# Patient Record
Sex: FEMALE | Race: WHITE | Employment: OTHER | URBAN - METROPOLITAN AREA
[De-identification: names, ages, dates, MRNs, and addresses within clinical notes are randomized per-mention and may not be internally consistent; named-entity substitution may affect disease eponyms.]

---

## 2021-04-01 LAB
ALBUMIN SERPL-MCNC: NORMAL G/DL
ALP BLD-CCNC: NORMAL U/L
ALT SERPL-CCNC: NORMAL U/L
AST SERPL-CCNC: NORMAL U/L
BILIRUB SERPL-MCNC: NORMAL MG/DL
BUN BLDV-MCNC: NORMAL MG/DL
CALCIUM SERPL-MCNC: NORMAL MG/DL
CHLORIDE BLD-SCNC: NORMAL MMOL/L
CHOLESTEROL, TOTAL: NORMAL
CHOLESTEROL/HDL RATIO: NORMAL
CO2: NORMAL
CREAT SERPL-MCNC: NORMAL MG/DL
GLUCOSE FASTING: NORMAL
HDLC SERPL-MCNC: NORMAL MG/DL
LDL CHOLESTEROL CALCULATED: NORMAL
NONHDLC SERPL-MCNC: NORMAL MG/DL
POTASSIUM SERPL-SCNC: NORMAL MMOL/L
SODIUM BLD-SCNC: NORMAL MMOL/L
TOTAL PROTEIN: NORMAL
TRIGL SERPL-MCNC: NORMAL MG/DL
VLDLC SERPL CALC-MCNC: NORMAL MG/DL

## 2021-10-26 ENCOUNTER — OFFICE VISIT (OUTPATIENT)
Dept: PRIMARY CARE CLINIC | Age: 86
End: 2021-10-26
Payer: MEDICARE

## 2021-10-26 VITALS
SYSTOLIC BLOOD PRESSURE: 124 MMHG | HEIGHT: 68 IN | OXYGEN SATURATION: 96 % | DIASTOLIC BLOOD PRESSURE: 78 MMHG | WEIGHT: 190.2 LBS | BODY MASS INDEX: 28.82 KG/M2 | HEART RATE: 68 BPM | TEMPERATURE: 96.9 F

## 2021-10-26 DIAGNOSIS — J30.2 SEASONAL ALLERGIC RHINITIS, UNSPECIFIED TRIGGER: ICD-10-CM

## 2021-10-26 DIAGNOSIS — J45.909 ASTHMA DUE TO ENVIRONMENTAL ALLERGIES: ICD-10-CM

## 2021-10-26 DIAGNOSIS — Z76.89 ENCOUNTER TO ESTABLISH CARE WITH NEW DOCTOR: Primary | ICD-10-CM

## 2021-10-26 DIAGNOSIS — F41.1 GENERALIZED ANXIETY DISORDER: ICD-10-CM

## 2021-10-26 DIAGNOSIS — Z12.31 BREAST CANCER SCREENING BY MAMMOGRAM: ICD-10-CM

## 2021-10-26 DIAGNOSIS — F43.21 GRIEF: ICD-10-CM

## 2021-10-26 PROBLEM — R91.8 PULMONARY NODULES: Status: ACTIVE | Noted: 2021-10-26

## 2021-10-26 PROBLEM — I48.0 PAROXYSMAL ATRIAL FIBRILLATION (HCC): Status: ACTIVE | Noted: 2021-10-26

## 2021-10-26 PROBLEM — K63.5 SESSILE COLONIC POLYP: Status: ACTIVE | Noted: 2021-10-26

## 2021-10-26 PROCEDURE — 99204 OFFICE O/P NEW MOD 45 MIN: CPT | Performed by: STUDENT IN AN ORGANIZED HEALTH CARE EDUCATION/TRAINING PROGRAM

## 2021-10-26 RX ORDER — CETIRIZINE HYDROCHLORIDE 10 MG/1
1 TABLET ORAL DAILY
COMMUNITY
Start: 2021-09-20 | End: 2022-01-04 | Stop reason: SDUPTHER

## 2021-10-26 RX ORDER — ALBUTEROL SULFATE 90 UG/1
2 AEROSOL, METERED RESPIRATORY (INHALATION) EVERY 4 HOURS PRN
Qty: 18 G | Refills: 0 | Status: SHIPPED | OUTPATIENT
Start: 2021-10-26

## 2021-10-26 RX ORDER — FLUTICASONE PROPIONATE 50 MCG
SPRAY, SUSPENSION (ML) NASAL
COMMUNITY
Start: 2021-09-20

## 2021-10-26 RX ORDER — BACILLUS COAGULANS/INULIN 1B-250 MG
1 CAPSULE ORAL DAILY
COMMUNITY

## 2021-10-26 RX ORDER — ALPRAZOLAM 0.25 MG/1
1 TABLET ORAL DAILY PRN
COMMUNITY
Start: 2021-09-20

## 2021-10-26 RX ORDER — SODIUM CHLORIDE/ALOE VERA
GEL (GRAM) NASAL
COMMUNITY
Start: 2021-07-29 | End: 2022-04-26

## 2021-10-26 RX ORDER — CITALOPRAM 20 MG/1
1 TABLET ORAL DAILY
COMMUNITY
Start: 2021-09-20 | End: 2022-01-04 | Stop reason: SDUPTHER

## 2021-10-26 RX ORDER — PANTOPRAZOLE SODIUM 40 MG/1
1 TABLET, DELAYED RELEASE ORAL DAILY
COMMUNITY
Start: 2021-09-20 | End: 2022-01-04 | Stop reason: SDUPTHER

## 2021-10-26 RX ORDER — RIVAROXABAN 15 MG/1
1 TABLET, FILM COATED ORAL DAILY
COMMUNITY
Start: 2021-10-22 | End: 2021-11-16 | Stop reason: SDUPTHER

## 2021-10-26 SDOH — ECONOMIC STABILITY: FOOD INSECURITY: WITHIN THE PAST 12 MONTHS, THE FOOD YOU BOUGHT JUST DIDN'T LAST AND YOU DIDN'T HAVE MONEY TO GET MORE.: NEVER TRUE

## 2021-10-26 SDOH — ECONOMIC STABILITY: FOOD INSECURITY: WITHIN THE PAST 12 MONTHS, YOU WORRIED THAT YOUR FOOD WOULD RUN OUT BEFORE YOU GOT MONEY TO BUY MORE.: NEVER TRUE

## 2021-10-26 ASSESSMENT — PATIENT HEALTH QUESTIONNAIRE - PHQ9
2. FEELING DOWN, DEPRESSED OR HOPELESS: 0
SUM OF ALL RESPONSES TO PHQ9 QUESTIONS 1 & 2: 0
SUM OF ALL RESPONSES TO PHQ QUESTIONS 1-9: 0
1. LITTLE INTEREST OR PLEASURE IN DOING THINGS: 0
SUM OF ALL RESPONSES TO PHQ QUESTIONS 1-9: 0
SUM OF ALL RESPONSES TO PHQ QUESTIONS 1-9: 0

## 2021-10-26 ASSESSMENT — SOCIAL DETERMINANTS OF HEALTH (SDOH): HOW HARD IS IT FOR YOU TO PAY FOR THE VERY BASICS LIKE FOOD, HOUSING, MEDICAL CARE, AND HEATING?: NOT HARD AT ALL

## 2021-10-26 NOTE — PROGRESS NOTES
weeks (around 11/23/2021) for medicare wellness visit. This provider and patient were wearing surgical masks and practiced social distancing when appropriate during visit due to COVID-19 pandemic. Subjective:     Chief Complaint   Patient presents with    Established New Doctor       Patient needs new PCP. Reviewed history as below. Patient notes her partner is developing dementia. This has caused her a lot of stress. Talks with neighbor for support. Review of Systems   Respiratory: Negative for cough and shortness of breath. Cardiovascular: Negative for chest pain and palpitations. Allergic/Immunologic: Positive for environmental allergies. Psychiatric/Behavioral: Negative for dysphoric mood and suicidal ideas. The patient is not nervous/anxious. Medical History:   History reviewed and updated as needed.     Patient Active Problem List   Diagnosis    Paroxysmal atrial fibrillation (HCC)    Seasonal allergic rhinitis    Asthma due to environmental allergies    Pulmonary nodules    Generalized anxiety disorder    Sessile colonic polyp        Past Medical History:   Diagnosis Date    Anxiety     Asthma     Atrial fibrillation [facility])        Past Surgical History:   Procedure Laterality Date    CATARACT REMOVAL Bilateral     CHOLECYSTECTOMY  2000    ELBOW SURGERY Right 2011    FOOT SURGERY Right 2010    ROTATOR CUFF REPAIR Right 2014       Family History   Problem Relation Age of Onset    Stroke Mother     No Known Problems Brother     Stroke Maternal Grandmother     Other Maternal Grandfather         brain aneurysm    Stroke Paternal Grandmother     No Known Problems Son     No Known Problems Son     No Known Problems Son     Lung Cancer Brother     Emphysema Sister        Medications:     Current Outpatient Medications:     cetirizine (ZYRTEC) 10 MG tablet, Take 1 tablet by mouth daily, Disp: , Rfl:     ALPRAZolam (XANAX) 0.25 MG tablet, Take 1 tablet by mouth daily as needed. , Disp: , Rfl:     XARELTO 15 MG TABS tablet, Take 1 tablet by mouth daily, Disp: , Rfl:     citalopram (CELEXA) 20 MG tablet, Take 1 tablet by mouth daily, Disp: , Rfl:     pantoprazole (PROTONIX) 40 MG tablet, Take 1 tablet by mouth daily, Disp: , Rfl:     fluticasone (FLONASE) 50 MCG/ACT nasal spray, , Disp: , Rfl:     saline nasal gel (AYR) GEL, , Disp: , Rfl:     Budeson-Glycopyrrol-Formoterol 160-9-4.8 MCG/ACT AERO, Inhale 1 puff into the lungs 4 times daily as needed, Disp: , Rfl:     Bacillus Coagulans-Inulin (PROBIOTIC) 1-250 BILLION-MG CAPS, Take 1 capsule by mouth daily, Disp: , Rfl:     Allergies:   No Known Allergies    Social History:     Social History     Socioeconomic History    Marital status: Single     Spouse name: Not on file    Number of children: Not on file    Years of education: Not on file    Highest education level: Not on file   Occupational History    Not on file   Tobacco Use    Smoking status: Never Smoker    Smokeless tobacco: Never Used   Substance and Sexual Activity    Alcohol use: Not on file    Drug use: Not on file    Sexual activity: Not on file   Other Topics Concern    Not on file   Social History Narrative    Not on file     Social Determinants of Health     Financial Resource Strain: Low Risk     Difficulty of Paying Living Expenses: Not hard at all   Food Insecurity: No Food Insecurity    Worried About Running Out of Food in the Last Year: Never true    Raeann of Food in the Last Year: Never true   Transportation Needs:     Lack of Transportation (Medical):      Lack of Transportation (Non-Medical):    Physical Activity:     Days of Exercise per Week:     Minutes of Exercise per Session:    Stress:     Feeling of Stress :    Social Connections:     Frequency of Communication with Friends and Family:     Frequency of Social Gatherings with Friends and Family:     Attends Orthodoxy Services:     Active Member of Clubs or Answer:   screening     Order Specific Question:   Where will the exam be performed? Answer:   [facility]    JOSE SAMARIA DIGITAL SCREEN BILATERAL        No results found for this or any previous visit (from the past 24 hour(s)).

## 2021-11-03 ENCOUNTER — TELEPHONE (OUTPATIENT)
Dept: PRIMARY CARE CLINIC | Age: 86
End: 2021-11-03

## 2021-11-03 NOTE — TELEPHONE ENCOUNTER
----- Message from Moises Oconnor MA sent at 11/3/2021 10:44 AM EDT -----  Subject: Message to Provider    QUESTIONS  Information for Provider? Lost Mammo order. can she stop in an d  a   new one? Please call when ready. ---------------------------------------------------------------------------  --------------  Julisajake Ibarra INFO  What is the best way for the office to contact you? OK to leave message on   voicemail  Preferred Call Back Phone Number? [phone]  ---------------------------------------------------------------------------  --------------  SCRIPT ANSWERS  Relationship to Patient?  Self

## 2021-11-18 RX ORDER — RIVAROXABAN 15 MG/1
15 TABLET, FILM COATED ORAL DAILY
Qty: 30 TABLET | Refills: 5 | Status: SHIPPED
Start: 2021-11-18 | End: 2022-01-04 | Stop reason: SDUPTHER

## 2021-11-23 ENCOUNTER — OFFICE VISIT (OUTPATIENT)
Dept: PRIMARY CARE CLINIC | Age: 86
End: 2021-11-23
Payer: MEDICARE

## 2021-11-23 VITALS
SYSTOLIC BLOOD PRESSURE: 138 MMHG | TEMPERATURE: 97.3 F | OXYGEN SATURATION: 95 % | WEIGHT: 191.6 LBS | DIASTOLIC BLOOD PRESSURE: 80 MMHG | HEART RATE: 69 BPM | BODY MASS INDEX: 29.04 KG/M2 | HEIGHT: 68 IN

## 2021-11-23 DIAGNOSIS — E66.3 OVERWEIGHT (BMI 25.0-29.9): ICD-10-CM

## 2021-11-23 DIAGNOSIS — E04.2 MULTINODULAR THYROID: ICD-10-CM

## 2021-11-23 DIAGNOSIS — Z71.89 ADVANCED CARE PLANNING/COUNSELING DISCUSSION: ICD-10-CM

## 2021-11-23 DIAGNOSIS — E78.2 MIXED HYPERLIPIDEMIA: ICD-10-CM

## 2021-11-23 DIAGNOSIS — R91.8 PULMONARY NODULES: ICD-10-CM

## 2021-11-23 DIAGNOSIS — Z13.1 SCREENING FOR DIABETES MELLITUS (DM): ICD-10-CM

## 2021-11-23 DIAGNOSIS — M85.89 OSTEOPENIA OF MULTIPLE SITES: ICD-10-CM

## 2021-11-23 DIAGNOSIS — E04.2 MULTINODULAR THYROID: Primary | ICD-10-CM

## 2021-11-23 DIAGNOSIS — Z00.00 ROUTINE GENERAL MEDICAL EXAMINATION AT A HEALTH CARE FACILITY: Primary | ICD-10-CM

## 2021-11-23 PROBLEM — I35.1 MODERATE AORTIC VALVE REGURGITATION: Status: ACTIVE | Noted: 2021-11-23

## 2021-11-23 PROBLEM — E04.1 THYROID NODULE: Status: ACTIVE | Noted: 2021-11-23

## 2021-11-23 PROBLEM — J43.8 OTHER EMPHYSEMA (HCC): Status: ACTIVE | Noted: 2021-10-26

## 2021-11-23 PROBLEM — I35.8 MILD AORTIC VALVE SCLEROSIS: Status: ACTIVE | Noted: 2021-11-23

## 2021-11-23 PROBLEM — K64.8 OTHER HEMORRHOIDS: Status: ACTIVE | Noted: 2021-11-23

## 2021-11-23 LAB
ALBUMIN SERPL-MCNC: 4.3 G/DL (ref 3.5–5.2)
ALP BLD-CCNC: 71 U/L (ref 35–104)
ALT SERPL-CCNC: 69 U/L (ref 0–32)
ANION GAP SERPL CALCULATED.3IONS-SCNC: 11 MMOL/L (ref 7–16)
AST SERPL-CCNC: 58 U/L (ref 0–31)
BILIRUB SERPL-MCNC: 0.3 MG/DL (ref 0–1.2)
BUN BLDV-MCNC: 11 MG/DL (ref 6–23)
CALCIUM SERPL-MCNC: 9.1 MG/DL (ref 8.6–10.2)
CHLORIDE BLD-SCNC: 102 MMOL/L (ref 98–107)
CHOLESTEROL, TOTAL: 256 MG/DL (ref 0–199)
CO2: 24 MMOL/L (ref 22–29)
CREAT SERPL-MCNC: 1.1 MG/DL (ref 0.5–1)
GFR AFRICAN AMERICAN: 57
GFR NON-AFRICAN AMERICAN: 47 ML/MIN/1.73
GLUCOSE BLD-MCNC: 91 MG/DL (ref 74–99)
HDLC SERPL-MCNC: 36 MG/DL
LDL CHOLESTEROL CALCULATED: 174 MG/DL (ref 0–99)
POTASSIUM SERPL-SCNC: 4.5 MMOL/L (ref 3.5–5)
SODIUM BLD-SCNC: 137 MMOL/L (ref 132–146)
T4 FREE: 1.03 NG/DL (ref 0.93–1.7)
TOTAL PROTEIN: 6.6 G/DL (ref 6.4–8.3)
TRIGL SERPL-MCNC: 231 MG/DL (ref 0–149)
TSH SERPL DL<=0.05 MIU/L-ACNC: 1.89 UIU/ML (ref 0.27–4.2)
VLDLC SERPL CALC-MCNC: 46 MG/DL

## 2021-11-23 PROCEDURE — G0439 PPPS, SUBSEQ VISIT: HCPCS | Performed by: STUDENT IN AN ORGANIZED HEALTH CARE EDUCATION/TRAINING PROGRAM

## 2021-11-23 ASSESSMENT — PATIENT HEALTH QUESTIONNAIRE - PHQ9
2. FEELING DOWN, DEPRESSED OR HOPELESS: 0
SUM OF ALL RESPONSES TO PHQ QUESTIONS 1-9: 0
SUM OF ALL RESPONSES TO PHQ QUESTIONS 1-9: 0
1. LITTLE INTEREST OR PLEASURE IN DOING THINGS: 0
SUM OF ALL RESPONSES TO PHQ QUESTIONS 1-9: 0
SUM OF ALL RESPONSES TO PHQ9 QUESTIONS 1 & 2: 0

## 2021-11-23 ASSESSMENT — LIFESTYLE VARIABLES
AUDIT-C TOTAL SCORE: 3
HOW OFTEN DO YOU HAVE A DRINK CONTAINING ALCOHOL: 3
HOW MANY STANDARD DRINKS CONTAINING ALCOHOL DO YOU HAVE ON A TYPICAL DAY: 0
HOW OFTEN DO YOU HAVE SIX OR MORE DRINKS ON ONE OCCASION: 0

## 2021-11-23 ASSESSMENT — ENCOUNTER SYMPTOMS
COUGH: 0
SHORTNESS OF BREATH: 0

## 2021-11-23 NOTE — PROGRESS NOTES
Medicare Annual Wellness Visit  Name: Aishwarya Arciniega Date: 2021   MRN: <[id number]> Sex: Female   Age: 81 y.o. Ethnicity: Non- / Non    : 1935 Race: White (non-)      Anabella Gaona is here for Medicare AWV Anushka Quinteros)    Screenings for behavioral, psychosocial and functional/safety risks, and cognitive dysfunction are all negative except as indicated below. These results, as well as other patient data from the [address] form, are documented in Flowsheets linked to this Encounter. No Known Allergies    Prior to Visit Medications    Medication Sig Taking? Authorizing Provider   XARELTO 15 MG TABS tablet Take 1 tablet by mouth daily Yes Marium Wagner MD   cetirizine (ZYRTEC) 10 MG tablet Take 1 tablet by mouth daily Yes Historical Provider, MD   ALPRAZolam (XANAX) 0.25 MG tablet Take 1 tablet by mouth daily as needed.  Yes Historical Provider, MD   citalopram (CELEXA) 20 MG tablet Take 1 tablet by mouth daily Yes Historical Provider, MD   pantoprazole (PROTONIX) 40 MG tablet Take 1 tablet by mouth daily Yes Historical Provider, MD   fluticasone (FLONASE) 50 MCG/ACT nasal spray  Yes Historical Provider, MD   saline nasal gel (AYR) GEL  Yes Historical Provider, MD   Bacillus Coagulans-Inulin (PROBIOTIC) 1-250 BILLION-MG CAPS Take 1 capsule by mouth daily Yes Historical Provider, MD   albuterol sulfate HFA (VENTOLIN HFA) 108 (90 Base) MCG/ACT inhaler Inhale 2 puffs into the lungs every 4 hours as needed for Wheezing or Shortness of Breath (coughing) Yes Marium Wagner MD       Past Medical History:   Diagnosis Date    Anxiety     Asthma     Atrial fibrillation [facility])        Past Surgical History:   Procedure Laterality Date    CATARACT REMOVAL Bilateral     CHOLECYSTECTOMY  2000    ELBOW SURGERY Right 2011    FOOT SURGERY Right 2010    ROTATOR CUFF REPAIR Right 2014       Family History   Problem Relation Age of Onset    Stroke Mother     No Known Problems Brother     Stroke Maternal Grandmother     Other Maternal Grandfather         brain aneurysm    Stroke Paternal Grandmother     No Known Problems Son     No Known Problems Son     No Known Problems Son     Lung Cancer Brother     Emphysema Sister        CareTeam (Including outside providers/suppliers regularly involved in providing care):   Patient Care Team:  Merly Maurer MD as PCP - General (Family Medicine)  [facility]    Wt Readings from Last 3 Encounters:   11/23/21 191 lb 9.6 oz (86.9 kg)   10/26/21 190 lb 3.2 oz (86.3 kg)     Vitals:    11/23/21 1355   BP: 138/80   Pulse: 69   Temp: 97.3 °F (36.3 °C)   SpO2: 95%   Weight: 191 lb 9.6 oz (86.9 kg)   Height: 5' 8\" (1.727 m)     Body mass index is 29.13 kg/m². Based upon direct observation of the patient, evaluation of cognition reveals recent and remote memory intact. General Appearance: alert and oriented to person, place and time, well developed and well- nourished, in no acute distress  Skin: warm and dry, no rash or erythema  Head: normocephalic and atraumatic  Pulmonary/Chest: clear to auscultation bilaterally- no wheezes, rales or rhonchi, normal air movement, no respiratory distress  Cardiovascular: normal rate, regular rhythm, holosystolic murmur best heard at RUSB, no rubs, clicks, or gallops  Extremities: no cyanosis, clubbing or edema      Patient's complete Health Risk Assessment and screening values have been reviewed and are found in Flowsheets. The following problems were reviewed today and where indicated follow up appointments were made and/or referrals ordered. Positive Risk Factor Screenings with Interventions:          General Health and ACP:  General  In general, how would you say your health is?: Excellent  In the past 7 days, have you experienced any of the following?  New or Increased Pain, New or Increased Fatigue, Loneliness, Social Isolation, Stress or Anger?: None of These  Do you get the social and emotional support that you need?: Yes  Do you have a Living Will?: Yes  Advance Directives     Power of John Rowan Will ACP-Advance Directive ACP-Power of     Not on File Not on File Not on File Not on File      General Health Risk Interventions:  · None     Hearing/Vision:  No exam data present  Hearing/Vision  Do you or your family notice any trouble with your hearing that hasn't been managed with hearing aids?: (!) Yes  Do you have difficulty driving, watching TV, or doing any of your daily activities because of your eyesight?: No  Have you had an eye exam within the past year?: Yes  Hearing/Vision Interventions:  · Hearing concerns:  uses aids, has difficulty not being able to read lips with masks      Personalized Preventive Plan   Current Health Maintenance Status  Immunization History   Administered Date(s) Administered    COVID-19, Giovanna Ornelas, Primary or Immunocompromised, PF, 100mcg/0.5mL 01/20/2021, 02/17/2021, 11/03/2021    Influenza Virus Vaccine 09/27/2012, 10/01/2013    Influenza, High Dose (Fluzone 65 yrs and older) 09/22/2017, 09/10/2018    Influenza, High-dose, Quadv, 65 yrs +, IM (Fluzone) 09/20/2021    Pneumococcal Conjugate 13-valent (Lorri Pancake) 01/01/2004    Pneumococcal Polysaccharide ([id number]) 08/12/2013        Health Maintenance   Topic Date Due    DTaP/Tdap/Td vaccine (1 - Tdap) Never done    Shingles Vaccine (1 of 2) Never done    DEXA (modify frequency per FRAX score)  Never done   [employer] Visit (AWV)  Never done    Flu vaccine  Completed    Pneumococcal 65+ years Vaccine  Completed    COVID-19 Vaccine  Completed    Hepatitis A vaccine  Aged Out    Hepatitis B vaccine  Aged Out    Hib vaccine  Aged Out    Meningococcal (ACWY) vaccine  Aged Out     Recommendations for Destineer Due: see orders and patient instructions/AVS.  .   Recommended screening schedule for the next 5-10 years is provided to the patient in written form: see Patient Sary Cuevas was seen today for medicare awv. Diagnoses and all orders for this visit:    Routine general medical examination at a health care facility  Comments:   updated  TD booster and DEXA previously completed  Patient advised to obtain Shingrix at pharmacy if not already completed    Osteopenia of multiple sites  Comments:  Last DEXA 6/2021, showed osteopenia  Next due 2023    Mixed hyperlipidemia  -     LIPID PANEL; Future    Screening for diabetes mellitus (DM)  -     COMPREHENSIVE METABOLIC PANEL; Future    Overweight (BMI 25.0-29.9)  -     LIPID PANEL; Future  -     COMPREHENSIVE METABOLIC PANEL; Future    Pulmonary nodules  -     CT CHEST WO CONTRAST; Future    Advanced care planning/counseling discussion  Comments:  Code status updated to full code  Patient will bring in copies of living will/POA  Orders:  -     Full code               Return in 6 weeks (on 1/4/2022) for follow-up thyroid, pulmonary nodules. Return for Medicare Annual Wellness Visit in 1 year.

## 2021-11-23 NOTE — PATIENT INSTRUCTIONS
Personalized Preventive Plan for Florentin Alcocer - 11/23/2021  Medicare offers a range of preventive health benefits. Some of the tests and screenings are paid in full while other may be subject to a deductible, co-insurance, and/or copay. Some of these benefits include a comprehensive review of your medical history including lifestyle, illnesses that may run in your family, and various assessments and screenings as appropriate. After reviewing your medical record and screening and assessments performed today your provider may have ordered immunizations, labs, imaging, and/or referrals for you. A list of these orders (if applicable) as well as your Preventive Care list are included within your After Visit Summary for your review. Other Preventive Recommendations:    · A preventive eye exam performed by an eye specialist is recommended every 1-2 years to screen for glaucoma; cataracts, macular degeneration, and other eye disorders. · A preventive dental visit is recommended every 6 months. · Try to get at least 150 minutes of exercise per week or 10,000 steps per day on a pedometer . · Order or download the FREE \"Exercise & Physical Activity: Your Everyday Guide\" from [employer]. Call 9-666.989.7248 or search [employer] online. · You need 4048-2088 mg of calcium and 9817-3436 IU of vitamin D per day. It is possible to meet your calcium requirement with diet alone, but a vitamin D supplement is usually necessary to meet this goal.  · When exposed to the sun, use a sunscreen that protects against both UVA and UVB radiation with an SPF of 30 or greater. Reapply every 2 to 3 hours or after sweating, drying off with a towel, or swimming. · Always wear a seat belt when traveling in a car. Always wear a helmet when riding a bicycle or motorcycle.

## 2021-11-24 ENCOUNTER — TELEPHONE (OUTPATIENT)
Dept: PRIMARY CARE CLINIC | Age: 86
End: 2021-11-24

## 2021-11-24 DIAGNOSIS — E78.2 MIXED HYPERLIPIDEMIA: Primary | ICD-10-CM

## 2021-11-24 PROBLEM — N18.31 ACUTE RENAL FAILURE SUPERIMPOSED ON STAGE 3A CHRONIC KIDNEY DISEASE (HCC): Status: ACTIVE | Noted: 2021-11-24

## 2021-11-24 PROBLEM — N17.9 ACUTE RENAL FAILURE SUPERIMPOSED ON STAGE 3A CHRONIC KIDNEY DISEASE (HCC): Status: ACTIVE | Noted: 2021-11-24

## 2021-11-24 NOTE — TELEPHONE ENCOUNTER
Spoke with Thai from [facility], states provider doesn't need new form since they received providers changed order for the Xarelto 15mg, but will call patient to confirm ok before sending since they will have to charge her another 85$ for new/changed medication, had already sent patient the Xarelto 10mg's. Will have patient call here to notify provider if disagrees with new RX.

## 2021-11-24 NOTE — TELEPHONE ENCOUNTER
----- Message from Katherine Quiroz MD sent at 11/24/2021  1:16 PM EST -----  Please call [facility] specialty pharmacy [phone] to request new form for Xarelto. Incorrect dosage written on previous form.

## 2021-12-13 DIAGNOSIS — E04.2 MULTINODULAR THYROID: ICD-10-CM

## 2021-12-13 DIAGNOSIS — R91.8 PULMONARY NODULES: ICD-10-CM

## 2021-12-20 ENCOUNTER — OFFICE VISIT (OUTPATIENT)
Dept: PRIMARY CARE CLINIC | Age: 86
End: 2021-12-20
Payer: MEDICARE

## 2021-12-20 VITALS
WEIGHT: 184 LBS | DIASTOLIC BLOOD PRESSURE: 72 MMHG | TEMPERATURE: 96.8 F | BODY MASS INDEX: 27.98 KG/M2 | SYSTOLIC BLOOD PRESSURE: 110 MMHG | OXYGEN SATURATION: 96 % | HEART RATE: 70 BPM

## 2021-12-20 DIAGNOSIS — K64.8 OTHER HEMORRHOIDS: ICD-10-CM

## 2021-12-20 DIAGNOSIS — I48.0 PAROXYSMAL ATRIAL FIBRILLATION (HCC): ICD-10-CM

## 2021-12-20 DIAGNOSIS — I35.1 MODERATE AORTIC VALVE REGURGITATION: ICD-10-CM

## 2021-12-20 DIAGNOSIS — K59.00 CONSTIPATION, UNSPECIFIED CONSTIPATION TYPE: Primary | ICD-10-CM

## 2021-12-20 PROCEDURE — 99213 OFFICE O/P EST LOW 20 MIN: CPT | Performed by: NURSE PRACTITIONER

## 2021-12-20 ASSESSMENT — ENCOUNTER SYMPTOMS
ABDOMINAL PAIN: 0
SINUS PAIN: 0
DIARRHEA: 0
EYE PAIN: 0
ABDOMINAL DISTENTION: 0
TROUBLE SWALLOWING: 0
PHOTOPHOBIA: 0
EYE REDNESS: 0
COUGH: 0
BLOOD IN STOOL: 1
RHINORRHEA: 0
SHORTNESS OF BREATH: 0
EYE DISCHARGE: 0
FACIAL SWELLING: 0
EYE ITCHING: 0
CHOKING: 0
SORE THROAT: 0
CHEST TIGHTNESS: 0
CONSTIPATION: 1
COLOR CHANGE: 0
VOICE CHANGE: 0
SINUS PRESSURE: 0
BACK PAIN: 0
WHEEZING: 0
NAUSEA: 0
VOMITING: 0

## 2021-12-20 ASSESSMENT — VISUAL ACUITY: OU: 1

## 2021-12-20 NOTE — PROGRESS NOTES
21  Anabella Gaona : 1935 Sex: female  Age: 81 y.o. Chief Complaint   Patient presents with    Constipation     x 3 weeks   no abdominal pain,  does have hemorrhoids bad now         She was seen today, through the walk-in clinic, for complaints of worsening constipation x3 weeks. She also reports that she has internal and external hemorrhoids, which she states have been bleeding, since she has been trying to pass her stool. She states that she has tried a number of different things including stool softeners and laxatives, with very little relief. She is requesting a referral to a gastroenterologist, in [place]. She denies any acute confusion, forgetfulness, any change in cognition. Review of Systems   Constitutional: Negative for appetite change, chills, diaphoresis, fatigue, fever and unexpected weight change. HENT: Negative for congestion, ear pain, facial swelling, hearing loss, nosebleeds, postnasal drip, rhinorrhea, sinus pressure, sinus pain, sneezing, sore throat, tinnitus, trouble swallowing and voice change. Eyes: Negative for photophobia, pain, discharge, redness and itching. Respiratory: Negative for cough, choking, chest tightness, shortness of breath and wheezing. Cardiovascular: Negative for chest pain, palpitations and leg swelling. Gastrointestinal: Positive for blood in stool (from hemorrhoids) and constipation (x 3 weeks). Negative for abdominal distention, abdominal pain, diarrhea, nausea and vomiting. Endocrine: Negative for cold intolerance, heat intolerance, polydipsia, polyphagia and polyuria. Genitourinary: Negative for difficulty urinating, dysuria, flank pain, frequency, hematuria and urgency. Musculoskeletal: Negative for arthralgias, back pain, gait problem, joint swelling, myalgias, neck pain and neck stiffness. Skin: Negative for color change, rash and wound. Allergic/Immunologic: Negative for environmental allergies and food allergies. Neurological: Negative for dizziness, tremors, seizures, syncope, facial asymmetry, speech difficulty, weakness, light-headedness, numbness and headaches. Hematological: Does not bruise/bleed easily. Psychiatric/Behavioral: Negative for agitation, behavioral problems, confusion, decreased concentration, hallucinations, self-injury, sleep disturbance and suicidal ideas. The patient is not nervous/anxious. Current Outpatient Medications:     XARELTO 15 MG TABS tablet, Take 1 tablet by mouth daily, Disp: 30 tablet, Rfl: 5    cetirizine (ZYRTEC) 10 MG tablet, Take 1 tablet by mouth daily, Disp: , Rfl:     ALPRAZolam (XANAX) 0.25 MG tablet, Take 1 tablet by mouth daily as needed. , Disp: , Rfl:     citalopram (CELEXA) 20 MG tablet, Take 1 tablet by mouth daily, Disp: , Rfl:     pantoprazole (PROTONIX) 40 MG tablet, Take 1 tablet by mouth daily, Disp: , Rfl:     fluticasone (FLONASE) 50 MCG/ACT nasal spray, , Disp: , Rfl:     saline nasal gel (AYR) GEL, , Disp: , Rfl:     Bacillus Coagulans-Inulin (PROBIOTIC) 1-250 BILLION-MG CAPS, Take 1 capsule by mouth daily, Disp: , Rfl:     albuterol sulfate HFA (VENTOLIN HFA) 108 (90 Base) MCG/ACT inhaler, Inhale 2 puffs into the lungs every 4 hours as needed for Wheezing or Shortness of Breath (coughing), Disp: 18 g, Rfl: 0  Allergies   Allergen Reactions    Gluten Meal Diarrhea    Statins        Past Medical History:   Diagnosis Date    Anxiety     Asthma     Atrial fibrillation ([facility] [address]      Past Surgical History:   Procedure Laterality Date    CATARACT REMOVAL Bilateral     CHOLECYSTECTOMY  2000    ELBOW SURGERY Right 2011    FOOT SURGERY Right 2010    ROTATOR CUFF REPAIR Right 2014     Family History   Problem Relation Age of Onset    Stroke Mother     No Known Problems Brother     Stroke Maternal Grandmother     Other Maternal Grandfather         brain aneurysm    Stroke Paternal Grandmother     No Known Problems Son     No Known Problems Son     No Known Problems Son     Lung Cancer Brother     Emphysema Sister      Social History     Socioeconomic History    Marital status: Single     Spouse name: Not on file    Number of children: Not on file    Years of education: Not on file    Highest education level: Not on file   Occupational History    Not on file   Tobacco Use    Smoking status: Never Smoker    Smokeless tobacco: Never Used   Substance and Sexual Activity    Alcohol use: Yes     Alcohol/week: 7.0 standard drinks     Types: 7 Shots of liquor per week     Comment: once daily    Drug use: Never    Sexual activity: Not Currently   Other Topics Concern    Not on file   Social History Narrative    Not on file     Social Determinants of Health     Financial Resource Strain: Low Risk     Difficulty of Paying Living Expenses: Not hard at all   Food Insecurity: No Food Insecurity    Worried About [address] in the Last Year: Never true    [address] [employer] in the Last Year: Never true   Transportation Needs:     Lack of Transportation (Medical): Not on file    Lack of Transportation (Non-Medical):  Not on file   Physical Activity:     Days of Exercise per Week: Not on file    Minutes of Exercise per Session: Not on file   Stress:     Feeling of Stress : Not on file   Social Connections:     Frequency of Communication with Friends and Family: Not on file    Frequency of Social Gatherings with Friends and Family: Not on file    Attends Holiness Services: Not on file    Active Member of Clubs or Organizations: Not on file    Attends Club or Organization Meetings: Not on file    Marital Status: Not on file   Intimate Partner Violence:     Fear of Current or Ex-Partner: Not on file    Emotionally Abused: Not on file    Physically Abused: Not on file    Sexually Abused: Not on file   Housing Stability:     Unable to Pay for Housing in the Last Year: Not on file    Number of [place] in the Last Year: Not on file    Unstable Housing in the Last Year: Not on file       Vitals:    12/20/21 1020   BP: 110/72   Pulse: 70   Temp: 96.8 °F (36 °C)   TempSrc: Temporal   SpO2: 96%   Weight: 184 lb (83.5 kg)       Physical Exam  Vitals and nursing note reviewed. Constitutional:       General: She is awake. She is not in acute distress. Appearance: Normal appearance. She is well-developed. She is obese. She is not ill-appearing, toxic-appearing or diaphoretic. HENT:      Head: Normocephalic and atraumatic. Right Ear: External ear normal. Decreased hearing noted. Left Ear: External ear normal. Decreased hearing noted. Mouth/Throat:      Lips: Pink. No lesions. Mouth: Mucous membranes are moist.      Pharynx: Oropharynx is clear. No oropharyngeal exudate or posterior oropharyngeal erythema. Eyes:      General: Lids are normal. Vision grossly intact. Gaze aligned appropriately. No scleral icterus. Right eye: No discharge. Left eye: No discharge. Extraocular Movements: Extraocular movements intact. Conjunctiva/sclera: Conjunctivae normal.      Right eye: Right conjunctiva is not injected. Left eye: Left conjunctiva is not injected. Pupils: Pupils are equal, round, and reactive to light. Neck:      Thyroid: No thyromegaly. Vascular: No carotid bruit. Trachea: Trachea normal.   Cardiovascular:      Rate and Rhythm: Normal rate and regular rhythm. Pulses: Normal pulses. Heart sounds: S1 normal and S2 normal. Murmur heard. Systolic murmur is present with a grade of 2/6. No friction rub. No gallop. Pulmonary:      Effort: Pulmonary effort is normal. No tachypnea, accessory muscle usage or respiratory distress. Breath sounds: Normal breath sounds and air entry. No stridor. No wheezing, rhonchi or rales. Chest:      Chest wall: No tenderness. Abdominal:      General: Bowel sounds are normal. There is no distension.       Palpations: Abdomen is soft. There is no mass. Tenderness: There is no abdominal tenderness. There is no right CVA tenderness, left CVA tenderness, guarding or rebound. Hernia: No hernia is present. Musculoskeletal:         General: No swelling, tenderness, deformity or signs of injury. Normal range of motion. Cervical back: Full passive range of motion without pain, normal range of motion and neck supple. No rigidity. No muscular tenderness. Right lower leg: No edema. Left lower leg: No edema. Lymphadenopathy:      Cervical: No cervical adenopathy. Skin:     General: Skin is warm and dry. Capillary Refill: Capillary refill takes less than 2 seconds. Coloration: Skin is not jaundiced or pale. Findings: No bruising, erythema, lesion or rash. Neurological:      General: No focal deficit present. Mental Status: She is alert and oriented to person, place, and time. Mental status is at baseline. Cranial Nerves: Cranial nerves are intact. No cranial nerve deficit. Sensory: Sensation is intact. No sensory deficit. Motor: Motor function is intact. No weakness. Coordination: Coordination is intact. Coordination normal.      Gait: Gait is intact. Gait normal.      Deep Tendon Reflexes: Reflexes normal.   Psychiatric:         Attention and Perception: Attention and perception normal.         Mood and Affect: Mood and affect normal.         Speech: Speech normal.         Behavior: Behavior normal. Behavior is cooperative. Thought Content: Thought content normal.         Cognition and Memory: Cognition and memory normal.         Judgment: Judgment normal.         Assessment and Plan:  Nadir Segundo was seen today for constipation.     Diagnoses and all orders for this visit:    Constipation, unspecified constipation type  -     External Referral To Gastroenterology    Other hemorrhoids  -     External Referral To Gastroenterology    Moderate aortic valve regurgitation    Paroxysmal atrial fibrillation (HCC)        Discussions/Education provided to patients during visit:  [] Discussed the importance to stop smoking. [] Advised to monitor eating habits. [] Reviewed and discussed Imaging results. [] Reviewed and discussed Lab results. [x] Discussed the importance of drinking plenty of fluids. [] Cut down on Salt, Caffeine, and Sugar. [x] Continue Medications as Discussed. [x] Communicated with patient any concerns, to phone office. Return if symptoms worsen or fail to improve. I spent 15 minutes face-to-face with this patient.       Seen By:  JAMES Franklin NP

## 2021-12-28 ENCOUNTER — NURSE ONLY (OUTPATIENT)
Dept: PRIMARY CARE CLINIC | Age: 86
End: 2021-12-28

## 2021-12-28 DIAGNOSIS — E78.2 MIXED HYPERLIPIDEMIA: ICD-10-CM

## 2021-12-28 LAB
CHOLESTEROL, FASTING: 263 MG/DL (ref 0–199)
HDLC SERPL-MCNC: 35 MG/DL
LDL CHOLESTEROL CALCULATED: 180 MG/DL (ref 0–99)
TRIGLYCERIDE, FASTING: 242 MG/DL (ref 0–149)
VLDLC SERPL CALC-MCNC: 48 MG/DL

## 2021-12-28 PROCEDURE — 99999 PR OFFICE/OUTPT VISIT,PROCEDURE ONLY: CPT | Performed by: STUDENT IN AN ORGANIZED HEALTH CARE EDUCATION/TRAINING PROGRAM

## 2021-12-30 ENCOUNTER — TELEPHONE (OUTPATIENT)
Dept: PRIMARY CARE CLINIC | Age: 86
End: 2021-12-30

## 2022-01-04 ENCOUNTER — OFFICE VISIT (OUTPATIENT)
Dept: PRIMARY CARE CLINIC | Age: 87
End: 2022-01-04
Payer: MEDICARE

## 2022-01-04 VITALS
HEART RATE: 74 BPM | OXYGEN SATURATION: 95 % | SYSTOLIC BLOOD PRESSURE: 130 MMHG | DIASTOLIC BLOOD PRESSURE: 70 MMHG | HEIGHT: 67 IN | TEMPERATURE: 98.2 F | BODY MASS INDEX: 29.51 KG/M2 | WEIGHT: 188 LBS

## 2022-01-04 DIAGNOSIS — K63.5 SESSILE COLONIC POLYP: ICD-10-CM

## 2022-01-04 DIAGNOSIS — R91.8 PULMONARY NODULES: Primary | ICD-10-CM

## 2022-01-04 DIAGNOSIS — I48.0 PAROXYSMAL ATRIAL FIBRILLATION (HCC): ICD-10-CM

## 2022-01-04 DIAGNOSIS — K64.8 OTHER HEMORRHOIDS: ICD-10-CM

## 2022-01-04 DIAGNOSIS — E04.2 MULTINODULAR THYROID: ICD-10-CM

## 2022-01-04 DIAGNOSIS — E78.2 MIXED HYPERLIPIDEMIA: ICD-10-CM

## 2022-01-04 DIAGNOSIS — F41.1 GENERALIZED ANXIETY DISORDER: ICD-10-CM

## 2022-01-04 DIAGNOSIS — J30.2 SEASONAL ALLERGIC RHINITIS, UNSPECIFIED TRIGGER: ICD-10-CM

## 2022-01-04 PROBLEM — L25.9 CONTACT DERMATITIS: Status: ACTIVE | Noted: 2022-01-04

## 2022-01-04 PROBLEM — L73.9 DISEASE OF SEBACEOUS GLANDS: Status: ACTIVE | Noted: 2022-01-04

## 2022-01-04 PROBLEM — D18.01 HEMANGIOMA OF SKIN AND SUBCUTANEOUS TISSUE: Status: ACTIVE | Noted: 2022-01-04

## 2022-01-04 PROBLEM — D21.9 BENIGN NEOPLASM OF SOFT TISSUE: Status: ACTIVE | Noted: 2022-01-04

## 2022-01-04 PROBLEM — L81.9 ABNORMAL PIGMENTATION OF SKIN: Status: ACTIVE | Noted: 2022-01-04

## 2022-01-04 PROBLEM — L57.0 SENILE HYPERKERATOSIS: Status: ACTIVE | Noted: 2022-01-04

## 2022-01-04 PROBLEM — B35.3 TINEA PEDIS: Status: ACTIVE | Noted: 2022-01-04

## 2022-01-04 PROCEDURE — 99215 OFFICE O/P EST HI 40 MIN: CPT | Performed by: STUDENT IN AN ORGANIZED HEALTH CARE EDUCATION/TRAINING PROGRAM

## 2022-01-04 RX ORDER — RIVAROXABAN 15 MG/1
15 TABLET, FILM COATED ORAL DAILY
Qty: 30 TABLET | Refills: 5 | Status: SHIPPED
Start: 2022-01-04 | End: 2022-03-28 | Stop reason: SDUPTHER

## 2022-01-04 RX ORDER — CITALOPRAM 20 MG/1
20 TABLET ORAL DAILY
Qty: 90 TABLET | Refills: 1 | Status: SHIPPED
Start: 2022-01-04 | End: 2022-03-28 | Stop reason: SDUPTHER

## 2022-01-04 RX ORDER — CETIRIZINE HYDROCHLORIDE 10 MG/1
10 TABLET ORAL DAILY
Qty: 90 TABLET | Refills: 1 | Status: SHIPPED
Start: 2022-01-04 | End: 2022-05-31 | Stop reason: SDUPTHER

## 2022-01-04 RX ORDER — FENOFIBRATE 43 MG/1
43 CAPSULE ORAL
Qty: 90 CAPSULE | Refills: 1 | Status: SHIPPED
Start: 2022-01-04 | End: 2022-05-31 | Stop reason: SDUPTHER

## 2022-01-04 RX ORDER — PANTOPRAZOLE SODIUM 40 MG/1
40 TABLET, DELAYED RELEASE ORAL DAILY
Qty: 90 TABLET | Refills: 1 | Status: SHIPPED
Start: 2022-01-04 | End: 2022-05-31 | Stop reason: SDUPTHER

## 2022-01-04 ASSESSMENT — ENCOUNTER SYMPTOMS
VOMITING: 0
ANAL BLEEDING: 1
DIARRHEA: 0
BLOOD IN STOOL: 0
NAUSEA: 0
CONSTIPATION: 1

## 2022-01-04 NOTE — PROGRESS NOTES
ESTABLISHED PRIMARY CARE VISIT    22  Name: Mao Pichardo   : 1935   Age: 81 y.o.   Sex: female        Assessment & Plan:     Problem List Items Addressed This Visit        Circulatory    Paroxysmal atrial fibrillation (HCC)    Relevant Medications    XARELTO 15 MG TABS tablet    Other hemorrhoids     Symptomatic  GI follow-up scheduled            Respiratory    Seasonal allergic rhinitis    Relevant Medications    cetirizine (ZYRTEC) 10 MG tablet       Digestive    Sessile colonic polyp     History of polyps  GI follow-up scheduled            Endocrine    Multinodular thyroid     US with heterogeneous thyroid gland with multiple nodules on left, largest 3.5mm in diameter  Previous US with nodule >1cm  CT chest shows nodule off inferior pole left thyroid with several small calcifications, stable from 2019, amendable to percutaneous sampling  TSH, T4 normal  Referral to endo for recommendations         Relevant Orders    Amb External Referral To Endocrinology       Other    Pulmonary nodules - Primary     9mm spiculated nodule RUL on CT, previously 12mm  Also several 6-7mm nodules in RLL/LLL  Was previously followed by pulm who retired  Was not offered PET or biopsy at that time, was told might have been due to mold exposure  Referral to pulm for recommendations         Relevant Orders    [facility] ([facility]) - Tory Rivera MD, Pulmonary and Critical Care [place]    Generalized anxiety disorder    Relevant Medications    citalopram (CELEXA) 20 MG tablet    Mixed hyperlipidemia     Elevated triglycerides and LDL  Discussed unclear if any benefit in use of cholesterol lower medication at her age without known vascular disease, patient interested in trying  Given intolerance of statins, will trial fenofibrate         Relevant Medications    fenofibrate micronized (ANTARA) 43 MG capsule    XARELTO 15 MG TABS tablet          Counseled patient regarding above diagnosis, including possible risks and complications, especially if left uncontrolled. Counseled patient as appropriate and relevant regarding any possible side effects, risks, and alternatives to treatment; the patient verbalizes understanding, and is in agreement with the plan as detailed above. All educational materials and instructions were discussed and included on the After Visit Summary. All questions answered to the patient's satisfaction. The patient was advised to call or send [employer] message for any concerns prior to next appointment. Return in about 3 months (around 4/4/2022) for follow up. 47 minutes was spent precharting, face-to-face with patient, and documenting on day of encounter. This provider and patient were wearing surgical masks and practiced social distancing when appropriate during visit due to COVID-19 pandemic. Subjective:     Chief Complaint   Patient presents with    Results     going over test results     Medication Refill       Discussed results of lung and thyroid nodules  Patient interested in seeing specialists, but not yet sure if she would like to pursue any further workup    Reports history of severe muscle pain with statins  No improvement with Zetia in past  Interested in trying an alternative    Needs refills on medications    Review of Systems   Gastrointestinal: Positive for anal bleeding and constipation. Negative for blood in stool, diarrhea, nausea and vomiting. Positive for hemorrhoids.        Medical History:     Patient Active Problem List   Diagnosis    Paroxysmal atrial fibrillation (HCC)    Seasonal allergic rhinitis    Other emphysema (HCC)    Pulmonary nodules    Generalized anxiety disorder    Sessile colonic polyp    Multinodular thyroid    Osteopenia of multiple sites    Other hemorrhoids    Mixed hyperlipidemia    Mild aortic valve sclerosis    Moderate aortic valve regurgitation    Chronic kidney disease, stage 3a ([address]        Past Medical History:   Diagnosis Date    Anxiety     Asthma     Atrial fibrillation [facility])        Past Surgical History:   Procedure Laterality Date    CATARACT REMOVAL Bilateral     CHOLECYSTECTOMY  2000    ELBOW SURGERY Right 2011    FOOT SURGERY Right 2010    ROTATOR CUFF REPAIR Right 2014       Family History   Problem Relation Age of Onset    Stroke Mother     No Known Problems Brother     Stroke Maternal Grandmother     Other Maternal Grandfather         brain aneurysm    Stroke Paternal Grandmother     No Known Problems Son     No Known Problems Son     No Known Problems Son     Lung Cancer Brother     Emphysema Sister        Medications:     Current Outpatient Medications:     XARELTO 15 MG TABS tablet, Take 1 tablet by mouth daily, Disp: 30 tablet, Rfl: 5    cetirizine (ZYRTEC) 10 MG tablet, Take 1 tablet by mouth daily, Disp: , Rfl:     ALPRAZolam (XANAX) 0.25 MG tablet, Take 1 tablet by mouth daily as needed. , Disp: , Rfl:     citalopram (CELEXA) 20 MG tablet, Take 1 tablet by mouth daily, Disp: , Rfl:     pantoprazole (PROTONIX) 40 MG tablet, Take 1 tablet by mouth daily, Disp: , Rfl:     fluticasone (FLONASE) 50 MCG/ACT nasal spray, , Disp: , Rfl:     Bacillus Coagulans-Inulin (PROBIOTIC) 1-250 BILLION-MG CAPS, Take 1 capsule by mouth daily, Disp: , Rfl:     saline nasal gel (AYR) GEL, , Disp: , Rfl:     albuterol sulfate HFA (VENTOLIN HFA) 108 (90 Base) MCG/ACT inhaler, Inhale 2 puffs into the lungs every 4 hours as needed for Wheezing or Shortness of Breath (coughing) (Patient not taking: Reported on 1/4/2022), Disp: 18 g, Rfl: 0    Allergies:      Allergies   Allergen Reactions    Gluten Meal Diarrhea    Statins        Social History:     Social History     Socioeconomic History    Marital status: Single     Spouse name: Not on file    Number of children: Not on file    Years of education: Not on file    Highest education level: Not on file   Occupational History    Not on file   Tobacco Use    Smoking status: Never Smoker    Smokeless tobacco: Never Used   Substance and Sexual Activity    Alcohol use: Yes     Alcohol/week: 7.0 standard drinks     Types: 7 Shots of liquor per week     Comment: once daily    Drug use: Never    Sexual activity: Not Currently   Other Topics Concern    Not on file   Social History Narrative    Not on file     Social Determinants of Health     Financial Resource Strain: Low Risk     Difficulty of Paying Living Expenses: Not hard at all   Food Insecurity: No Food Insecurity    Worried About [address] in the Last Year: Never true    [facility] in the Last Year: Never true   Transportation Needs:     Lack of Transportation (Medical): Not on file    Lack of Transportation (Non-Medical):  Not on file   Physical Activity:     Days of Exercise per Week: Not on file    Minutes of Exercise per Session: Not on file   Stress:     Feeling of Stress : Not on file   Social Connections:     Frequency of Communication with Friends and Family: Not on file    Frequency of Social Gatherings with Friends and Family: Not on file    Attends [employer] Services: Not on file    Active Member of [address] [place] [address] or Organizations: Not on file    Attends Club or Organization Meetings: Not on file    Marital Status: Not on file   Intimate Partner Violence:     Fear of Current or Ex-Partner: Not on file    Emotionally Abused: Not on file    Physically Abused: Not on file    Sexually Abused: Not on file   Housing Stability:     Unable to Pay for Housing in the Last Year: Not on file    Number of [place] in the Last Year: Not on file    Unstable Housing in the Last Year: Not on file       Physical Exam:     Vitals:    01/04/22 1302   BP: 130/70   Pulse: 74   Temp: 98.2 °F (36.8 °C)   SpO2: 95%   Weight: 188 lb (85.3 kg)   Height: 5' 7\" (1.702 m)       BP Readings from Last 3 Encounters:   01/04/22 130/70   12/20/21 110/72   11/23/21 138/80       Wt Readings from Last 3 Encounters:   01/04/22 188 lb (85.3 kg)   12/20/21 184 lb (83.5 kg)   11/23/21 191 lb 9.6 oz (86.9 kg)       Physical Exam  Vitals and nursing note reviewed. Constitutional:       General: She is not in acute distress. Appearance: Normal appearance. She is not ill-appearing or diaphoretic. Neck:      Thyroid: No thyroid mass, thyromegaly or thyroid tenderness. Cardiovascular:      Rate and Rhythm: Normal rate and regular rhythm. Heart sounds: Normal heart sounds. Pulmonary:      Effort: Pulmonary effort is normal. No respiratory distress. Breath sounds: Normal breath sounds. Skin:     General: Skin is warm and dry. Neurological:      Mental Status: She is alert and oriented to person, place, and time. Testing:     Orders Placed This Encounter   Procedures    [facility] ([facility]) - Maribel Carvalho MD, Pulmonary and Critical Care [place]     Referral Priority:   Routine     Referral Type:   Eval and Treat     Referral Reason:   Specialty Services Required     Requested Specialty:   Pulmonary Disease     Number of Visits Requested:   1    Amb External Referral To Endocrinology     Referral Priority:   Routine     Referral Type:   Consult for Advice and Opinion     Referral Reason:   Specialty Services Required     Requested Specialty:   Endocrinology     Number of Visits Requested:   1        No results found for this or any previous visit (from the past 24 hour(s)).

## 2022-01-04 NOTE — PATIENT INSTRUCTIONS
Lung doctor Dario Bernal or [place]) for 9mm spiculated lung nodule, decreased from 12mm    Endo thyroid doctor Nataly) for 3.5mm thyroid nodule on US, calcifications on CT

## 2022-01-05 NOTE — ASSESSMENT & PLAN NOTE
Elevated triglycerides and LDL  Discussed unclear if any benefit in use of cholesterol lower medication at her age without known vascular disease, patient interested in trying  Given intolerance of statins, will trial fenofibrate

## 2022-01-05 NOTE — ASSESSMENT & PLAN NOTE
9mm spiculated nodule RUL on CT, previously 12mm  Also several 6-7mm nodules in RLL/LLL  Was previously followed by pulm who retired  Was not offered PET or biopsy at that time, was told might have been due to mold exposure  Referral to pulm for recommendations

## 2022-03-28 DIAGNOSIS — I48.0 PAROXYSMAL ATRIAL FIBRILLATION (HCC): ICD-10-CM

## 2022-03-28 DIAGNOSIS — F41.1 GENERALIZED ANXIETY DISORDER: ICD-10-CM

## 2022-03-28 RX ORDER — CITALOPRAM 20 MG/1
20 TABLET ORAL DAILY
Qty: 90 TABLET | Refills: 1 | Status: SHIPPED
Start: 2022-03-28 | End: 2022-05-31 | Stop reason: SDUPTHER

## 2022-03-28 RX ORDER — RIVAROXABAN 15 MG/1
15 TABLET, FILM COATED ORAL DAILY
Qty: 30 TABLET | Refills: 5 | Status: SHIPPED
Start: 2022-03-28 | End: 2022-10-26 | Stop reason: SDUPTHER

## 2022-03-28 NOTE — TELEPHONE ENCOUNTER
----- Message from Sandy Huerta sent at 3/28/2022 11:09 AM EDT -----  Subject: Refill Request    QUESTIONS  Name of Medication? citalopram (CELEXA) 20 MG tablet  Patient-reported dosage and instructions? unknown  How many days do you have left? 0  Preferred Pharmacy? [facility],[address] [place] [address]  Pharmacy phone number (if available)? [phone]  ---------------------------------------------------------------------------  --------------,  Name of Medication? XARELTO 15 MG TABS tablet  Patient-reported dosage and instructions? unknown  How many days do you have left? 0  Preferred Pharmacy? [facility],[address] [place] [address]  Pharmacy phone number (if available)? [phone]  ---------------------------------------------------------------------------  --------------  CALL BACK INFO  What is the best way for the office to contact you? OK to leave message on   voicemail  Preferred Call Back Phone Number?  [phone]

## 2022-04-26 ENCOUNTER — OFFICE VISIT (OUTPATIENT)
Dept: PRIMARY CARE CLINIC | Age: 87
End: 2022-04-26
Payer: MEDICARE

## 2022-04-26 VITALS
WEIGHT: 186.8 LBS | HEIGHT: 67 IN | DIASTOLIC BLOOD PRESSURE: 74 MMHG | BODY MASS INDEX: 29.32 KG/M2 | TEMPERATURE: 98.4 F | SYSTOLIC BLOOD PRESSURE: 136 MMHG | OXYGEN SATURATION: 95 % | HEART RATE: 80 BPM

## 2022-04-26 DIAGNOSIS — E78.2 MIXED HYPERLIPIDEMIA: ICD-10-CM

## 2022-04-26 DIAGNOSIS — R00.2 PALPITATIONS: ICD-10-CM

## 2022-04-26 DIAGNOSIS — K56.2 VOLVULUS OF SIGMOID COLON (HCC): ICD-10-CM

## 2022-04-26 DIAGNOSIS — F41.1 GENERALIZED ANXIETY DISORDER: ICD-10-CM

## 2022-04-26 DIAGNOSIS — M17.0 BILATERAL PRIMARY OSTEOARTHRITIS OF KNEE: ICD-10-CM

## 2022-04-26 DIAGNOSIS — I35.8 MILD AORTIC VALVE SCLEROSIS: ICD-10-CM

## 2022-04-26 DIAGNOSIS — K59.00 CONSTIPATION, UNSPECIFIED CONSTIPATION TYPE: Primary | ICD-10-CM

## 2022-04-26 PROBLEM — K63.5 SESSILE COLONIC POLYP: Status: RESOLVED | Noted: 2021-10-26 | Resolved: 2022-04-26

## 2022-04-26 PROCEDURE — 99214 OFFICE O/P EST MOD 30 MIN: CPT | Performed by: STUDENT IN AN ORGANIZED HEALTH CARE EDUCATION/TRAINING PROGRAM

## 2022-04-26 PROCEDURE — 93000 ELECTROCARDIOGRAM COMPLETE: CPT | Performed by: STUDENT IN AN ORGANIZED HEALTH CARE EDUCATION/TRAINING PROGRAM

## 2022-04-26 RX ORDER — CONTAINER,EMPTY
BOTTLE MISCELLANEOUS
COMMUNITY

## 2022-04-26 ASSESSMENT — ENCOUNTER SYMPTOMS
ANAL BLEEDING: 0
BLOOD IN STOOL: 0
CONSTIPATION: 1
NAUSEA: 0
ABDOMINAL PAIN: 0
SHORTNESS OF BREATH: 0
VOMITING: 0
DIARRHEA: 0

## 2022-04-26 NOTE — PATIENT INSTRUCTIONS
Patient Education        Knee Arthritis: Exercises  Introduction  Here are some examples of exercises for you to try. The exercises may be suggested for a condition or for rehabilitation. Start each exercise slowly. Ease off the exercises if you start to have pain. You will be told when to start these exercises and which ones will work bestfor you. How to do the exercises  Knee flexion with heel slide    1. Lie on your back with your knees bent. 2. Slide your heel back by bending your affected knee as far as you can. Then hook your other foot around your ankle to help pull your heel even farther back. 3. Hold for about 6 seconds, then rest for up to 10 seconds. 4. Repeat 8 to 12 times. 5. Switch legs and repeat steps 1 through 4, even if only one knee is sore. Quad sets    1. Sit with your affected leg straight and supported on the floor or a firm bed. Place a small, rolled-up towel under your knee. Your other leg should be bent, with that foot flat on the floor. 2. Tighten the thigh muscles of your affected leg by pressing the back of your knee down into the towel. 3. Hold for about 6 seconds, then rest for up to 10 seconds. 4. Repeat 8 to 12 times. 5. Switch legs and repeat steps 1 through 4, even if only one knee is sore. Straight-leg raises to the front    1. Lie on your back with your good knee bent so that your foot rests flat on the floor. Your affected leg should be straight. Make sure that your low back has a normal curve. You should be able to slip your hand in between the floor and the small of your back, with your palm touching the floor and your back touching the back of your hand. 2. Tighten the thigh muscles in your affected leg by pressing the back of your knee flat down to the floor. Hold your knee straight. 3. Keeping the thigh muscles tight and your leg straight, lift your affected leg up so that your heel is about 12 inches off the floor.  Hold for about 6 seconds, then lower slowly. 4. Relax for up to 10 seconds between repetitions. 5. Repeat 8 to 12 times. 6. Switch legs and repeat steps 1 through 5, even if only one knee is sore. Active knee flexion    1. Lie on your stomach with your knees straight. If your kneecap is uncomfortable, roll up a washcloth and put it under your leg just above your kneecap. 2. Lift the foot of your affected leg by bending the knee so that you bring the foot up toward your buttock. If this motion hurts, try it without bending your knee quite as far. This may help you avoid any painful motion. 3. Slowly move your leg up and down. 4. Repeat 8 to 12 times. 5. Switch legs and repeat steps 1 through 4, even if only one knee is sore. Quadriceps stretch (facedown)    1. Lie flat on your stomach, and rest your face on the floor. 2. Wrap a towel or belt strap around the lower part of your affected leg. Then use the towel or belt strap to slowly pull your heel toward your buttock until you feel a stretch. 3. Hold for about 15 to 30 seconds, then relax your leg against the towel or belt strap. 4. Repeat 2 to 4 times. 5. Switch legs and repeat steps 1 through 4, even if only one knee is sore. Stationary exercise bike    1. If you do not have a stationary exercise bike at home, you can find one to ride at your local health club or community center. 2. Adjust the height of the bike seat so that your knee is slightly bent when your leg is extended downward. If your knee hurts when the pedal reaches the top, you can raise the seat so that your knee does not bend as much. 3. Start slowly. At first, try to do 5 to 10 minutes of cycling with little to no resistance. Then increase your time and the resistance bit by bit until you can do 20 to 30 minutes without pain. 4. If you start to have pain, rest your knee until your pain gets back to the level that is normal for you. Or cycle for less time or with less effort.   Follow-up care is a key part of your treatment and safety. Be sure to make and go to all appointments, and call your doctor if you are having problems. It's also a good idea to know your test results and keep alist of the medicines you take. Where can you learn more? Go to [email or web address]://[email or web address]. org and sign in to your [email or web address] account. Enter C159 in the [employer] box to learn more about \"Knee Arthritis: Exercises. \"     If you do not have an account, please click on the \"Sign Up Now\" link. Current as of: July 1, 2021               Content Version: 13.2  © 2006-2022 [employer]. Care instructions adapted under license by [facility] ([facility]). If you have questions about a medical condition or this instruction, always ask your healthcare professional. [address] any warranty or liability for your use of this information.

## 2022-04-26 NOTE — PROGRESS NOTES
ESTABLISHED PRIMARY CARE VISIT    23  Name: Ravindra Hernandez   : 1935   Age: 81 y.o. Sex: female        Assessment & Plan:     Problem List Items Addressed This Visit        Circulatory    Mild aortic valve sclerosis     History of aortic valve sclerosis and regurgitation  Last echo several years ago  Murmur heard today, not previously heard on exams  Recheck echo         Relevant Orders    Echocardiogram complete       Musculoskeletal and Integument    Bilateral primary osteoarthritis of knee     Check XR bilateral knees  Patient desires steroid injections  Declines Ortho referral  Provided exercises for strengthening at home         Relevant Medications    diclofenac sodium (VOLTAREN) 1 % GEL    Other Relevant Orders    XR KNEE RIGHT (MIN 4 VIEWS)    XR KNEE LEFT (MIN 4 VIEWS) (Completed)       Other    Constipation - Primary     Chronic, likely secondary to twisting of sigmoid colon per colonoscopy  Unable to perform colonoscopy, CT virtual colonoscopy without polyps  Continue current OTC medications per GI         Generalized anxiety disorder     Slightly increased at this time due to stress  Continue Celexa  Monitor closely         Mixed hyperlipidemia     Intolerance of statins  Tolerating fenofibrate well  Recheck fasting lipids before next visit         Relevant Orders    Lipid, Fasting      Other Visit Diagnoses     Volvulus of sigmoid colon ([address]        Palpitations        EKG with sinus bradycardia, otherwise unremarkable  Palpitations likely secondary to intermittent atrial fibrillation versus anxiety    Relevant Orders    EKG 12 lead (Completed)          Counseled patient regarding above diagnosis, including possible risks and complications, especially if left uncontrolled. Counseled patient as appropriate and relevant regarding any possible side effects, risks, and alternatives to treatment; the patient verbalizes understanding, and is in agreement with the plan as detailed above. All educational materials and instructions were discussed and included on the After Visit Summary. All questions answered to the patient's satisfaction. The patient was advised to call or send [employer] message for any concerns prior to next appointment. Return in about 4 weeks (around 5/24/2022) for bilateral knee injection, fasting lipid. 33 minutes was spent precharting, face-to-face with patient, and documenting on day of encounter. This provider and patient were wearing surgical masks and practiced social distancing when appropriate during visit due to COVID-19 pandemic. Subjective:     Chief Complaint   Patient presents with    Follow-up     check up        Fibrate started for hyperlipidemia, CVD risk  Denies side effects    GI for hemorrhoids and history of polyps  Had 2 colonoscopies, unsuccessful  CT virtual colonoscopy without polyps  Hemorrhoids removed via [facility]  Has twisted sigmoid colon  Was advised to continue dulcolax oral, glycerin supp, and OTC enemas    Bilateral knee pain  Thinks she has arthritis  Interested in steroid injections    Has not yet seen endo or pulm due to dealing with bowel issues as above    Notes palpitations  Having to find a new home because partner's dementia is getting worse  Feels stressed      Review of Systems   Respiratory: Negative for shortness of breath. Cardiovascular: Positive for palpitations. Negative for chest pain. Gastrointestinal: Positive for constipation. Negative for abdominal pain, anal bleeding, blood in stool, diarrhea, nausea and vomiting. Musculoskeletal: Positive for arthralgias. Negative for joint swelling. Neurological: Negative for weakness and numbness.        Medical History:     Patient Active Problem List   Diagnosis    Paroxysmal atrial fibrillation (HCC)    Seasonal allergic rhinitis    Other emphysema (HCC)    Pulmonary nodules    Generalized anxiety disorder    Sessile colonic polyp    Multinodular thyroid    Osteopenia of multiple sites    Other hemorrhoids    Mixed hyperlipidemia    Mild aortic valve sclerosis    Moderate aortic valve regurgitation    Chronic kidney disease, stage 3a (HCC)    Hemangioma of skin and subcutaneous tissue    Benign neoplasm of soft tissue    Herpes zoster    Senile hyperkeratosis    Tinea pedis    Disease of sebaceous glands    Abnormal pigmentation of skin    Contact dermatitis        Past Medical History:   Diagnosis Date    Anxiety     Asthma     Atrial fibrillation ([address]     Diverticulosis        Past Surgical History:   Procedure Laterality Date    CATARACT REMOVAL Bilateral     CHOLECYSTECTOMY  2000    ELBOW SURGERY Right 2011    FOOT SURGERY Right 2010    ROTATOR CUFF REPAIR Right 2014       Family History   Problem Relation Age of Onset    Stroke Mother     No Known Problems Brother     Stroke Maternal Grandmother     Other Maternal Grandfather         brain aneurysm    Stroke Paternal Grandmother     No Known Problems Son     No Known Problems Son     No Known Problems Son     Lung Cancer Brother     Emphysema Sister        Medications:     Current Outpatient Medications:     citalopram (CELEXA) 20 MG tablet, Take 1 tablet by mouth daily, Disp: 90 tablet, Rfl: 1    XARELTO 15 MG TABS tablet, Take 1 tablet by mouth daily, Disp: 30 tablet, Rfl: 5    fenofibrate micronized (ANTARA) 43 MG capsule, Take 1 capsule by mouth every morning (before breakfast), Disp: 90 capsule, Rfl: 1    cetirizine (ZYRTEC) 10 MG tablet, Take 1 tablet by mouth daily, Disp: 90 tablet, Rfl: 1    pantoprazole (PROTONIX) 40 MG tablet, Take 1 tablet by mouth daily, Disp: 90 tablet, Rfl: 1    ALPRAZolam (XANAX) 0.25 MG tablet, Take 1 tablet by mouth daily as needed. , Disp: , Rfl:     fluticasone (FLONASE) 50 MCG/ACT nasal spray, , Disp: , Rfl:     Bacillus Coagulans-Inulin (PROBIOTIC) 1-250 BILLION-MG CAPS, Take 1 capsule by mouth daily, Disp: , Rfl:     albuterol sulfate HFA (VENTOLIN HFA) 108 (90 Base) MCG/ACT inhaler, Inhale 2 puffs into the lungs every 4 hours as needed for Wheezing or Shortness of Breath (coughing) (Patient not taking: Reported on 1/4/2022), Disp: 18 g, Rfl: 0    Allergies: Allergies   Allergen Reactions    Gluten Meal Diarrhea    Statins      Muscle cramps       Social History:     Social History     Socioeconomic History    Marital status: Single     Spouse name: Not on file    Number of children: Not on file    Years of education: Not on file    Highest education level: Not on file   Occupational History    Not on file   Tobacco Use    Smoking status: Never Smoker    Smokeless tobacco: Never Used   Substance and Sexual Activity    Alcohol use: Yes     Alcohol/week: 7.0 standard drinks     Types: 7 Shots of liquor per week     Comment: once daily    Drug use: Never    Sexual activity: Not Currently   Other Topics Concern    Not on file   Social History Narrative    Not on file     Social Determinants of Health     Financial Resource Strain: Low Risk     Difficulty of Paying Living Expenses: Not hard at all   Food Insecurity: No Food Insecurity    Worried About [facility] [facility] in the Last Year: Never true    [facility] in the Last Year: Never true   Transportation Needs:     Lack of Transportation (Medical): Not on file    Lack of Transportation (Non-Medical):  Not on file   Physical Activity:     Days of Exercise per Week: Not on file    Minutes of Exercise per Session: Not on file   Stress:     Feeling of Stress : Not on file   Social Connections:     Frequency of Communication with Friends and Family: Not on file    Frequency of Social Gatherings with Friends and Family: Not on file    Attends Baptism Services: Not on file    Active Member of Clubs or Organizations: Not on file    Attends Club or Organization Meetings: Not on file    Marital Status: Not on file   Intimate Partner Violence:     Fear of Current or Ex-Partner: Not on file    Emotionally Abused: Not on file    Physically Abused: Not on file    Sexually Abused: Not on file   Housing Stability:     Unable to Pay for Housing in the Last Year: Not on file    Number of Places Lived in the Last Year: Not on file    Unstable Housing in the Last Year: Not on file       Physical Exam:     Vitals:    04/26/22 1010   BP: 136/74   Site: Left Upper Arm   Position: Sitting   Pulse: 80   Temp: 98.4 °F (36.9 °C)   SpO2: 95%   Weight: 186 lb 12.8 oz (84.7 kg)   Height: 5' 7\" (1.702 m)       BP Readings from Last 3 Encounters:   04/26/22 136/74   01/04/22 130/70   12/20/21 110/72       Wt Readings from Last 3 Encounters:   04/26/22 186 lb 12.8 oz (84.7 kg)   01/04/22 188 lb (85.3 kg)   12/20/21 184 lb (83.5 kg)       Physical Exam  Vitals and nursing note reviewed. Constitutional:       General: She is not in acute distress. Appearance: Normal appearance. She is overweight. She is not ill-appearing or diaphoretic. Eyes:      Extraocular Movements: Extraocular movements intact. Conjunctiva/sclera: Conjunctivae normal.   Neck:      Thyroid: No thyroid mass, thyromegaly or thyroid tenderness. Cardiovascular:      Rate and Rhythm: Normal rate and regular rhythm. Heart sounds: Murmur heard. Systolic murmur is present with a grade of 3/6. Pulmonary:      Effort: Pulmonary effort is normal. No respiratory distress. Breath sounds: Normal breath sounds. Musculoskeletal:      Right knee: No swelling, effusion, erythema, ecchymosis or crepitus. Normal range of motion. Tenderness present over the medial joint line and lateral joint line. No patellar tendon tenderness. Left knee: No swelling, effusion, erythema, ecchymosis or crepitus. Normal range of motion. Tenderness present over the medial joint line and lateral joint line. No patellar tendon tenderness. Skin:     General: Skin is warm and dry.    Neurological:      Mental Status: She is alert and oriented to person, place, and time. Testing:     Orders Placed This Encounter   Procedures    XR KNEE RIGHT (MIN 4 VIEWS)     Standing Status:   Future     Standing Expiration Date:   4/26/2023    XR KNEE LEFT (MIN 4 VIEWS)     Standing Status:   Future     Standing Expiration Date:   4/26/2023    Lipid, Fasting     Standing Status:   Future     Standing Expiration Date:   4/26/2023        No results found for this or any previous visit (from the past 24 hour(s)). EKG: sinus bradycardia, nonspecific ST and T waves changes, normal QTC, there are no previous tracings available for comparison.

## 2022-04-26 NOTE — ASSESSMENT & PLAN NOTE
History of aortic valve sclerosis and regurgitation  Last echo several years ago  Murmur heard today, not previously heard on exams  Recheck echo

## 2022-04-26 NOTE — ASSESSMENT & PLAN NOTE
Chronic, likely secondary to twisting of sigmoid colon per colonoscopy  Unable to perform colonoscopy, CT virtual colonoscopy without polyps  Continue current OTC medications per GI

## 2022-04-26 NOTE — ASSESSMENT & PLAN NOTE
Check XR bilateral knees  Patient desires steroid injections  Declines Ortho referral  Provided exercises for strengthening at home

## 2022-05-25 ENCOUNTER — TELEPHONE (OUTPATIENT)
Dept: PRIMARY CARE CLINIC | Age: 87
End: 2022-05-25

## 2022-05-25 NOTE — TELEPHONE ENCOUNTER
Left detailed msg for patient to call back to let us know where else she would like to have her echocardiogram done. Since [facility] is not doing them at this time.

## 2022-05-26 ENCOUNTER — TELEPHONE (OUTPATIENT)
Dept: PRIMARY CARE CLINIC | Age: 87
End: 2022-05-26

## 2022-05-26 NOTE — TELEPHONE ENCOUNTER
Pt is supposed to have an echocardiogram and was going to have it done at [facility] but they couldn't get her scheduled. Pt is asking if she can go to [facility] because that is in pts network but she needs a referral. Pt is asking if she would be able to get multiple referrals to different places just in case [facility] doesn't get her in soon because she will be going out of town on the 15th of June. Informed pt I did not think we could do multiple referral but would double check with you. Please advise.

## 2022-05-27 NOTE — TELEPHONE ENCOUNTER
Patient can call [facility] and [address] [phone] to see how far out they are scheduling echos. Then we can send the echo to whichever can get her in sooner.

## 2022-05-31 ENCOUNTER — OFFICE VISIT (OUTPATIENT)
Dept: PRIMARY CARE CLINIC | Age: 87
End: 2022-05-31
Payer: MEDICARE

## 2022-05-31 VITALS
HEIGHT: 67 IN | HEART RATE: 65 BPM | OXYGEN SATURATION: 96 % | SYSTOLIC BLOOD PRESSURE: 126 MMHG | WEIGHT: 187.4 LBS | BODY MASS INDEX: 29.41 KG/M2 | TEMPERATURE: 98.4 F | DIASTOLIC BLOOD PRESSURE: 70 MMHG

## 2022-05-31 DIAGNOSIS — I35.8 MILD AORTIC VALVE SCLEROSIS: ICD-10-CM

## 2022-05-31 DIAGNOSIS — E78.2 MIXED HYPERLIPIDEMIA: ICD-10-CM

## 2022-05-31 DIAGNOSIS — M17.0 BILATERAL PRIMARY OSTEOARTHRITIS OF KNEE: Primary | ICD-10-CM

## 2022-05-31 DIAGNOSIS — J30.2 SEASONAL ALLERGIC RHINITIS, UNSPECIFIED TRIGGER: ICD-10-CM

## 2022-05-31 DIAGNOSIS — I35.1 MODERATE AORTIC VALVE REGURGITATION: ICD-10-CM

## 2022-05-31 DIAGNOSIS — F41.1 GENERALIZED ANXIETY DISORDER: ICD-10-CM

## 2022-05-31 PROCEDURE — 99214 OFFICE O/P EST MOD 30 MIN: CPT | Performed by: STUDENT IN AN ORGANIZED HEALTH CARE EDUCATION/TRAINING PROGRAM

## 2022-05-31 PROCEDURE — 1123F ACP DISCUSS/DSCN MKR DOCD: CPT | Performed by: STUDENT IN AN ORGANIZED HEALTH CARE EDUCATION/TRAINING PROGRAM

## 2022-05-31 PROCEDURE — 20610 DRAIN/INJ JOINT/BURSA W/O US: CPT | Performed by: STUDENT IN AN ORGANIZED HEALTH CARE EDUCATION/TRAINING PROGRAM

## 2022-05-31 RX ORDER — PANTOPRAZOLE SODIUM 40 MG/1
40 TABLET, DELAYED RELEASE ORAL DAILY
Qty: 90 TABLET | Refills: 1 | Status: SHIPPED
Start: 2022-05-31 | End: 2022-06-19 | Stop reason: SDUPTHER

## 2022-05-31 RX ORDER — CITALOPRAM 20 MG/1
20 TABLET ORAL DAILY
Qty: 90 TABLET | Refills: 1 | Status: SHIPPED | OUTPATIENT
Start: 2022-05-31

## 2022-05-31 RX ORDER — FENOFIBRATE 43 MG/1
43 CAPSULE ORAL
Qty: 90 CAPSULE | Refills: 1 | Status: SHIPPED
Start: 2022-05-31 | End: 2022-08-30

## 2022-05-31 RX ORDER — CETIRIZINE HYDROCHLORIDE 10 MG/1
10 TABLET ORAL DAILY
Qty: 90 TABLET | Refills: 1 | Status: SHIPPED | OUTPATIENT
Start: 2022-05-31

## 2022-05-31 RX ADMIN — TRIAMCINOLONE ACETONIDE 40 MG: 40 INJECTION, SUSPENSION INTRA-ARTICULAR; INTRAMUSCULAR at 11:15

## 2022-05-31 RX ADMIN — TRIAMCINOLONE ACETONIDE 40 MG: 40 INJECTION, SUSPENSION INTRA-ARTICULAR; INTRAMUSCULAR at 11:30

## 2022-05-31 ASSESSMENT — PATIENT HEALTH QUESTIONNAIRE - PHQ9
9. THOUGHTS THAT YOU WOULD BE BETTER OFF DEAD, OR OF HURTING YOURSELF: 0
6. FEELING BAD ABOUT YOURSELF - OR THAT YOU ARE A FAILURE OR HAVE LET YOURSELF OR YOUR FAMILY DOWN: 0
10. IF YOU CHECKED OFF ANY PROBLEMS, HOW DIFFICULT HAVE THESE PROBLEMS MADE IT FOR YOU TO DO YOUR WORK, TAKE CARE OF THINGS AT HOME, OR GET ALONG WITH OTHER PEOPLE: 0
1. LITTLE INTEREST OR PLEASURE IN DOING THINGS: 0
SUM OF ALL RESPONSES TO PHQ QUESTIONS 1-9: 6
SUM OF ALL RESPONSES TO PHQ QUESTIONS 1-9: 6
3. TROUBLE FALLING OR STAYING ASLEEP: 0
8. MOVING OR SPEAKING SO SLOWLY THAT OTHER PEOPLE COULD HAVE NOTICED. OR THE OPPOSITE, BEING SO FIGETY OR RESTLESS THAT YOU HAVE BEEN MOVING AROUND A LOT MORE THAN USUAL: 0
7. TROUBLE CONCENTRATING ON THINGS, SUCH AS READING THE NEWSPAPER OR WATCHING TELEVISION: 0
5. POOR APPETITE OR OVEREATING: 0
4. FEELING TIRED OR HAVING LITTLE ENERGY: 3
2. FEELING DOWN, DEPRESSED OR HOPELESS: 3
SUM OF ALL RESPONSES TO PHQ9 QUESTIONS 1 & 2: 3
SUM OF ALL RESPONSES TO PHQ QUESTIONS 1-9: 6
SUM OF ALL RESPONSES TO PHQ QUESTIONS 1-9: 6

## 2022-05-31 NOTE — PROGRESS NOTES
Knee Injection Procedure Note    Pre-operative Diagnosis: bilateral knee osteroarthritis    Post-operative Diagnosis: same    Indications: Symptom relief from osteoarthritis    Anesthesia: Lidocaine 1% without epinephrine    Procedure Details     Verbal and written consent was obtained for the procedure. Timeout performed immediately prior to procedure. Bilateral knee joint skin prepped with alcohol. The skin was anesthetized with ethyl chloride spray. A 21 gauge needle was inserted into the anterior inferior aspect of each joint from a lateral approach. 4 ml 1% lidocaine and 1 ml of triamcinolone (KENALOG) 40mg/ml was then injected into each knee with new needle. The needle was removed and the area cleansed and dressed. Complications:  None; patient tolerated the procedure well. Plan:  Counseled on signs of infection and other complications and provided return/ED precautions.

## 2022-05-31 NOTE — PROGRESS NOTES
ESTABLISHED PRIMARY CARE VISIT    23  Name: Karthik Lam   : 1935   Age: 81 y.o. Sex: female        Assessment & Plan:     Problem List Items Addressed This Visit        Circulatory    Mild aortic valve sclerosis     History of aortic valve sclerosis and regurgitation  Last echo several years ago  Murmur heard again today  Patient had to reschedule echo for different hospital, may be unable to get this completed prior to going to [place]         Relevant Medications    fenofibrate micronized (ANTARA) 43 MG capsule    Moderate aortic valve regurgitation    Relevant Medications    fenofibrate micronized (ANTARA) 43 MG capsule       Respiratory    Seasonal allergic rhinitis     Continue Flonase, Zyrtec         Relevant Medications    cetirizine (ZYRTEC) 10 MG tablet       Musculoskeletal and Integument    Bilateral primary osteoarthritis of knee - Primary     Patient returns for bilateral knee steroid injections today  See separate procedure note  Consider ortho referral upon return from [place]            Other    Generalized anxiety disorder     Slightly increased at this time due to stress  Continue Celexa 20mg daily  Monitor closely         Relevant Medications    citalopram (CELEXA) 20 mg tablet    Mixed hyperlipidemia     Elevated triglycerides and LDL  Discussed unclear if any benefit in use of cholesterol lower medication at her age without known vascular disease, patient would like to proceed  Intolerance of statins  Continue fenofibrate  Repeat lipids pending         Relevant Medications    fenofibrate micronized (ANTARA) 43 MG capsule          Counseled patient regarding above diagnosis, including possible risks and complications, especially if left uncontrolled. Counseled patient as appropriate and relevant regarding any possible side effects, risks, and alternatives to treatment; the patient verbalizes understanding, and is in agreement with the plan as detailed above.    All educational materials and instructions were discussed and included on the After Visit Summary. All questions answered to the patient's satisfaction. The patient was advised to call or send [employer] message for any concerns prior to next appointment. Return in about 3 months (around 8/31/2022). This provider and patient were wearing surgical masks and practiced social distancing when appropriate during visit due to COVID-19 pandemic. Subjective:     Chief Complaint   Patient presents with    Other     bilateral knee injection and fasting lipid        Patient returns for bilateral knee injection    Needs to go to [place] for a couple months  Partner cannot live independently anymore, needs to find new place to live  On waiting list for senior living      Review of Systems   Eyes: Negative for visual disturbance. Respiratory: Negative for shortness of breath. Cardiovascular: Positive for palpitations. Negative for chest pain. Musculoskeletal: Positive for arthralgias. Neurological: Negative for headaches.        Medical History:     Patient Active Problem List   Diagnosis    Paroxysmal atrial fibrillation (HCC)    Seasonal allergic rhinitis    Other emphysema (HCC)    Pulmonary nodules    Generalized anxiety disorder    Multinodular thyroid    Osteopenia of multiple sites    Mixed hyperlipidemia    Mild aortic valve sclerosis    Moderate aortic valve regurgitation    Chronic kidney disease, stage 3a ([address]    Hemangioma of skin and subcutaneous tissue    Benign neoplasm of soft tissue    Herpes zoster    Senile hyperkeratosis    Tinea pedis    Disease of sebaceous glands    Abnormal pigmentation of skin    Contact dermatitis    Constipation    Bilateral primary osteoarthritis of knee        Past Medical History:   Diagnosis Date    Anxiety     Asthma     Atrial fibrillation ([address]     Diverticulosis     Sessile colonic polyp 10/26/2021       Past Surgical History:   Procedure Laterality Date    CATARACT REMOVAL Bilateral     CHOLECYSTECTOMY  2000    ELBOW SURGERY Right 2011    FOOT SURGERY Right 2010    ROTATOR CUFF REPAIR Right 2014       Family History   Problem Relation Age of Onset    Stroke Mother     No Known Problems Brother     Stroke Maternal Grandmother     Other Maternal Grandfather         brain aneurysm    Stroke Paternal Grandmother     No Known Problems Son     No Known Problems Son     No Known Problems Son     Lung Cancer Brother     Emphysema Sister        Medications:     Current Outpatient Medications:     diclofenac sodium (VOLTAREN) 1 % GEL, Apply 4 g topically 4 times daily as needed for Pain, Disp: 150 g, Rfl: 0    bisacodyl (DULCOLAX) 5 MG EC tablet, Take 5 mg by mouth daily as needed, Disp: , Rfl:     Glycerin, Laxative, (GLYCERIN ADULT) 2 g SUPP, Place rectally, Disp: , Rfl:     Rubber Goods (ENEMA BOTTLE) MISC, by Does not apply route, Disp: , Rfl:     citalopram (CELEXA) 20 MG tablet, Take 1 tablet by mouth daily, Disp: 90 tablet, Rfl: 1    XARELTO 15 MG TABS tablet, Take 1 tablet by mouth daily, Disp: 30 tablet, Rfl: 5    fenofibrate micronized (ANTARA) 43 MG capsule, Take 1 capsule by mouth every morning (before breakfast), Disp: 90 capsule, Rfl: 1    cetirizine (ZYRTEC) 10 MG tablet, Take 1 tablet by mouth daily, Disp: 90 tablet, Rfl: 1    pantoprazole (PROTONIX) 40 MG tablet, Take 1 tablet by mouth daily, Disp: 90 tablet, Rfl: 1    ALPRAZolam (XANAX) 0.25 MG tablet, Take 1 tablet by mouth daily as needed. , Disp: , Rfl:     fluticasone (FLONASE) 50 MCG/ACT nasal spray, , Disp: , Rfl:     Bacillus Coagulans-Inulin (PROBIOTIC) 1-250 BILLION-MG CAPS, Take 1 capsule by mouth daily, Disp: , Rfl:     albuterol sulfate HFA (VENTOLIN HFA) 108 (90 Base) MCG/ACT inhaler, Inhale 2 puffs into the lungs every 4 hours as needed for Wheezing or Shortness of Breath (coughing), Disp: 18 g, Rfl: 0    Allergies:      Allergies   Allergen Reactions  Gluten Diarrhea    Gluten Meal Diarrhea    Statins      Muscle cramps       Social History:     Social History     Socioeconomic History    Marital status: Single     Spouse name: Not on file    Number of children: Not on file    Years of education: Not on file    Highest education level: Not on file   Occupational History    Not on file   Tobacco Use    Smoking status: Never Smoker    Smokeless tobacco: Never Used   Substance and Sexual Activity    Alcohol use: Yes     Alcohol/week: 7.0 standard drinks     Types: 7 Shots of liquor per week     Comment: once daily    Drug use: Never    Sexual activity: Not Currently   Other Topics Concern    Not on file   Social History Narrative    Not on file     Social Determinants of Health     Financial Resource Strain: Low Risk     Difficulty of Paying Living Expenses: Not hard at all   Food Insecurity: No Food Insecurity    Worried About [address] in the Last Year: Never true    [address] in the Last Year: Never true   Transportation Needs:     Lack of Transportation (Medical): Not on file    Lack of Transportation (Non-Medical):  Not on file   Physical Activity:     Days of Exercise per Week: Not on file    Minutes of Exercise per Session: Not on file   Stress:     Feeling of Stress : Not on file   Social Connections:     Frequency of Communication with Friends and Family: Not on file    Frequency of Social Gatherings with Friends and Family: Not on file    Attends Congregation Services: Not on file    Active Member of Clubs or Organizations: Not on file    Attends Club or Organization Meetings: Not on file    Marital Status: Not on file   Intimate Partner Violence:     Fear of Current or Ex-Partner: Not on file    Emotionally Abused: Not on file    Physically Abused: Not on file    Sexually Abused: Not on file   Housing Stability:     Unable to Pay for Housing in the Last Year: Not on file    Number of [place] in the Last Year: Not on file    Unstable Housing in the Last Year: Not on file       Physical Exam:     Vitals:    05/31/22 1001   BP: 126/70   Site: Left Upper Arm   Position: Sitting   Pulse: 65   Temp: 98.4 °F (36.9 °C)   SpO2: 96%   Weight: 187 lb 6.4 oz (85 kg)   Height: 5' 7\" (1.702 m)       BP Readings from Last 3 Encounters:   05/31/22 126/70   04/26/22 136/74   01/04/22 130/70       Wt Readings from Last 3 Encounters:   05/31/22 187 lb 6.4 oz (85 kg)   04/26/22 186 lb 12.8 oz (84.7 kg)   01/04/22 188 lb (85.3 kg)       Physical Exam  Vitals and nursing note reviewed. Constitutional:       General: She is not in acute distress. Appearance: Normal appearance. She is overweight. She is not ill-appearing or diaphoretic. Eyes:      Extraocular Movements: Extraocular movements intact. Conjunctiva/sclera: Conjunctivae normal.   Neck:      Thyroid: No thyroid mass, thyromegaly or thyroid tenderness. Cardiovascular:      Rate and Rhythm: Normal rate and regular rhythm. Heart sounds: Murmur heard. Systolic murmur is present with a grade of 3/6. Pulmonary:      Effort: Pulmonary effort is normal. No respiratory distress. Breath sounds: Normal breath sounds. Musculoskeletal:      Right knee: No swelling, effusion, erythema, ecchymosis or crepitus. Normal range of motion. Tenderness present over the medial joint line and lateral joint line. No patellar tendon tenderness. Left knee: No swelling, effusion, erythema, ecchymosis or crepitus. Normal range of motion. Tenderness present over the medial joint line and lateral joint line. No patellar tendon tenderness. Skin:     General: Skin is warm and dry. Neurological:      Mental Status: She is alert and oriented to person, place, and time. Testing:   No orders of the defined types were placed in this encounter. No results found for this or any previous visit (from the past 24 hour(s)).

## 2022-06-01 RX ORDER — TRIAMCINOLONE ACETONIDE 40 MG/ML
40 INJECTION, SUSPENSION INTRA-ARTICULAR; INTRAMUSCULAR ONCE
Status: COMPLETED | OUTPATIENT
Start: 2022-05-31 | End: 2022-05-31

## 2022-06-01 ASSESSMENT — ENCOUNTER SYMPTOMS: SHORTNESS OF BREATH: 0

## 2022-06-01 NOTE — ASSESSMENT & PLAN NOTE
Elevated triglycerides and LDL  Discussed unclear if any benefit in use of cholesterol lower medication at her age without known vascular disease, patient would like to proceed  Intolerance of statins  Continue fenofibrate  Repeat lipids pending

## 2022-06-01 NOTE — ASSESSMENT & PLAN NOTE
History of aortic valve sclerosis and regurgitation  Last echo several years ago  Murmur heard again today  Patient had to reschedule echo for different hospital, may be unable to get this completed prior to going to [place]

## 2022-06-19 RX ORDER — PANTOPRAZOLE SODIUM 40 MG/1
40 TABLET, DELAYED RELEASE ORAL DAILY
Qty: 90 TABLET | Refills: 1 | Status: SHIPPED | OUTPATIENT
Start: 2022-06-19

## 2022-08-27 DIAGNOSIS — E78.2 MIXED HYPERLIPIDEMIA: ICD-10-CM

## 2022-08-30 RX ORDER — FENOFIBRATE 43 MG/1
CAPSULE ORAL
Qty: 90 CAPSULE | Refills: 0 | Status: SHIPPED | OUTPATIENT
Start: 2022-08-30

## 2022-10-26 DIAGNOSIS — I48.0 PAROXYSMAL ATRIAL FIBRILLATION (HCC): ICD-10-CM

## 2022-10-26 RX ORDER — RIVAROXABAN 15 MG/1
15 TABLET, FILM COATED ORAL DAILY
Qty: 30 TABLET | Refills: 5 | Status: SHIPPED | OUTPATIENT
Start: 2022-10-26

## 2022-10-26 NOTE — TELEPHONE ENCOUNTER
Last OV 5/31/22   Next OV 11/29/22   Pt has moved to [place] and request this refill be sent to [facility] in [place] [address]

## 2022-11-14 ENCOUNTER — TELEPHONE (OUTPATIENT)
Dept: PRIMARY CARE CLINIC | Age: 87
End: 2022-11-14

## 2022-11-14 DIAGNOSIS — E78.2 MIXED HYPERLIPIDEMIA: ICD-10-CM

## 2022-11-14 DIAGNOSIS — N18.31 CHRONIC KIDNEY DISEASE, STAGE 3A (HCC): ICD-10-CM

## 2022-11-14 DIAGNOSIS — E04.2 MULTINODULAR THYROID: ICD-10-CM

## 2022-11-14 DIAGNOSIS — I48.0 PAROXYSMAL ATRIAL FIBRILLATION (HCC): Primary | ICD-10-CM

## 2022-11-14 DIAGNOSIS — F41.1 GENERALIZED ANXIETY DISORDER: ICD-10-CM

## 2022-11-14 NOTE — TELEPHONE ENCOUNTER
----- Message from [facility] sent at 11/14/2022  3:16 PM EST -----  Subject: Referral Request    Reason for referral request? complete labs  Provider patient wants to be referred to(if known):     Provider Phone Number(if known): Additional Information for Provider? pt has appt on 11/29/22 for AWV.    Please call pt when orders are in so she can come and have blood work.  ---------------------------------------------------------------------------  --------------  [address] [employer]    [phone]; OK to leave message on voicemail  ---------------------------------------------------------------------------  --------------

## 2022-11-17 NOTE — TELEPHONE ENCOUNTER
Will place order for annual labs however soonest patient can have these done is 11/24/22. Last year's labs completed 11/23.

## 2022-11-28 DIAGNOSIS — E78.2 MIXED HYPERLIPIDEMIA: ICD-10-CM

## 2022-11-28 DIAGNOSIS — I48.0 PAROXYSMAL ATRIAL FIBRILLATION (HCC): ICD-10-CM

## 2022-11-28 DIAGNOSIS — E04.2 MULTINODULAR THYROID: ICD-10-CM

## 2022-11-28 DIAGNOSIS — N18.31 CHRONIC KIDNEY DISEASE, STAGE 3A (HCC): ICD-10-CM

## 2022-11-28 DIAGNOSIS — F41.1 GENERALIZED ANXIETY DISORDER: ICD-10-CM

## 2022-11-28 LAB
ALBUMIN SERPL-MCNC: 4.2 G/DL (ref 3.5–5.2)
ALP BLD-CCNC: 62 U/L (ref 35–104)
ALT SERPL-CCNC: 51 U/L (ref 0–32)
ANION GAP SERPL CALCULATED.3IONS-SCNC: 16 MMOL/L (ref 7–16)
AST SERPL-CCNC: 52 U/L (ref 0–31)
BILIRUB SERPL-MCNC: 0.3 MG/DL (ref 0–1.2)
BUN BLDV-MCNC: 20 MG/DL (ref 6–23)
CALCIUM SERPL-MCNC: 9.7 MG/DL (ref 8.6–10.2)
CHLORIDE BLD-SCNC: 108 MMOL/L (ref 98–107)
CHOLESTEROL, FASTING: 260 MG/DL (ref 0–199)
CO2: 20 MMOL/L (ref 22–29)
CREAT SERPL-MCNC: 1.4 MG/DL (ref 0.5–1)
CREATININE URINE: 186 MG/DL (ref 29–226)
GFR SERPL CREATININE-BSD FRML MDRD: 37 ML/MIN/1.73
GLUCOSE FASTING: 98 MG/DL (ref 74–99)
HCT VFR BLD CALC: 41.6 % (ref 34–48)
HDLC SERPL-MCNC: 43 MG/DL
HEMOGLOBIN: 13.4 G/DL (ref 11.5–15.5)
LDL CHOLESTEROL CALCULATED: 173 MG/DL (ref 0–99)
MCH RBC QN AUTO: 30 PG (ref 26–35)
MCHC RBC AUTO-ENTMCNC: 32.2 % (ref 32–34.5)
MCV RBC AUTO: 93.1 FL (ref 80–99.9)
MICROALBUMIN UR-MCNC: <12 MG/L
MICROALBUMIN/CREAT UR-RTO: ABNORMAL (ref 0–30)
PDW BLD-RTO: 12.4 FL (ref 11.5–15)
PLATELET # BLD: 229 E9/L (ref 130–450)
PMV BLD AUTO: 11 FL (ref 7–12)
POTASSIUM SERPL-SCNC: 4.7 MMOL/L (ref 3.5–5)
RBC # BLD: 4.47 E12/L (ref 3.5–5.5)
SODIUM BLD-SCNC: 144 MMOL/L (ref 132–146)
TOTAL PROTEIN: 6.9 G/DL (ref 6.4–8.3)
TRIGLYCERIDE, FASTING: 221 MG/DL (ref 0–149)
TSH SERPL DL<=0.05 MIU/L-ACNC: 2.73 UIU/ML (ref 0.27–4.2)
VLDLC SERPL CALC-MCNC: 44 MG/DL
WBC # BLD: 6.8 E9/L (ref 4.5–11.5)

## 2022-11-29 ENCOUNTER — OFFICE VISIT (OUTPATIENT)
Dept: PRIMARY CARE CLINIC | Age: 87
End: 2022-11-29
Payer: MEDICARE

## 2022-11-29 VITALS
HEIGHT: 67 IN | WEIGHT: 194 LBS | BODY MASS INDEX: 30.45 KG/M2 | DIASTOLIC BLOOD PRESSURE: 84 MMHG | TEMPERATURE: 97.4 F | SYSTOLIC BLOOD PRESSURE: 136 MMHG | OXYGEN SATURATION: 93 % | HEART RATE: 84 BPM

## 2022-11-29 VITALS
WEIGHT: 194 LBS | SYSTOLIC BLOOD PRESSURE: 136 MMHG | OXYGEN SATURATION: 93 % | DIASTOLIC BLOOD PRESSURE: 84 MMHG | HEIGHT: 67 IN | HEART RATE: 84 BPM | TEMPERATURE: 97.4 F | BODY MASS INDEX: 30.45 KG/M2

## 2022-11-29 DIAGNOSIS — N90.89 VULVAR LESION: ICD-10-CM

## 2022-11-29 DIAGNOSIS — F41.1 GENERALIZED ANXIETY DISORDER: ICD-10-CM

## 2022-11-29 DIAGNOSIS — I35.8 MILD AORTIC VALVE SCLEROSIS: ICD-10-CM

## 2022-11-29 DIAGNOSIS — Z12.31 SCREENING MAMMOGRAM FOR BREAST CANCER: ICD-10-CM

## 2022-11-29 DIAGNOSIS — E78.2 MIXED HYPERLIPIDEMIA: ICD-10-CM

## 2022-11-29 DIAGNOSIS — H61.21 RIGHT EAR IMPACTED CERUMEN: ICD-10-CM

## 2022-11-29 DIAGNOSIS — N18.32 STAGE 3B CHRONIC KIDNEY DISEASE (CKD) (HCC): Primary | ICD-10-CM

## 2022-11-29 DIAGNOSIS — Z00.00 MEDICARE ANNUAL WELLNESS VISIT, SUBSEQUENT: Primary | ICD-10-CM

## 2022-11-29 DIAGNOSIS — R79.89 ELEVATED LIVER FUNCTION TESTS: ICD-10-CM

## 2022-11-29 DIAGNOSIS — Z71.89 COUNSELING REGARDING ADVANCED DIRECTIVES AND GOALS OF CARE: ICD-10-CM

## 2022-11-29 DIAGNOSIS — J43.8 OTHER EMPHYSEMA (HCC): ICD-10-CM

## 2022-11-29 DIAGNOSIS — K21.9 GASTROESOPHAGEAL REFLUX DISEASE, UNSPECIFIED WHETHER ESOPHAGITIS PRESENT: ICD-10-CM

## 2022-11-29 DIAGNOSIS — I35.1 MODERATE AORTIC VALVE REGURGITATION: ICD-10-CM

## 2022-11-29 LAB — T4 FREE: 1.04 NG/DL (ref 0.93–1.7)

## 2022-11-29 PROCEDURE — 99214 OFFICE O/P EST MOD 30 MIN: CPT | Performed by: STUDENT IN AN ORGANIZED HEALTH CARE EDUCATION/TRAINING PROGRAM

## 2022-11-29 PROCEDURE — 1123F ACP DISCUSS/DSCN MKR DOCD: CPT | Performed by: STUDENT IN AN ORGANIZED HEALTH CARE EDUCATION/TRAINING PROGRAM

## 2022-11-29 PROCEDURE — 69210 REMOVE IMPACTED EAR WAX UNI: CPT | Performed by: STUDENT IN AN ORGANIZED HEALTH CARE EDUCATION/TRAINING PROGRAM

## 2022-11-29 PROCEDURE — G0439 PPPS, SUBSEQ VISIT: HCPCS | Performed by: STUDENT IN AN ORGANIZED HEALTH CARE EDUCATION/TRAINING PROGRAM

## 2022-11-29 RX ORDER — FENOFIBRATE 43 MG/1
CAPSULE ORAL
Qty: 90 CAPSULE | Refills: 0 | Status: CANCELLED | OUTPATIENT
Start: 2022-11-29

## 2022-11-29 RX ORDER — CLOTRIMAZOLE 1 %
CREAM WITH APPLICATOR VAGINAL
Qty: 45 G | Refills: 0 | Status: SHIPPED | OUTPATIENT
Start: 2022-11-29

## 2022-11-29 RX ORDER — CITALOPRAM 20 MG/1
20 TABLET ORAL DAILY
Qty: 90 TABLET | Refills: 1 | Status: SHIPPED | OUTPATIENT
Start: 2022-11-29

## 2022-11-29 RX ORDER — FENOFIBRATE 43 MG/1
43 CAPSULE ORAL
Qty: 90 CAPSULE | Refills: 1 | Status: SHIPPED | OUTPATIENT
Start: 2022-11-29

## 2022-11-29 SDOH — ECONOMIC STABILITY: FOOD INSECURITY: WITHIN THE PAST 12 MONTHS, YOU WORRIED THAT YOUR FOOD WOULD RUN OUT BEFORE YOU GOT MONEY TO BUY MORE.: NEVER TRUE

## 2022-11-29 SDOH — ECONOMIC STABILITY: FOOD INSECURITY: WITHIN THE PAST 12 MONTHS, THE FOOD YOU BOUGHT JUST DIDN'T LAST AND YOU DIDN'T HAVE MONEY TO GET MORE.: NEVER TRUE

## 2022-11-29 ASSESSMENT — PATIENT HEALTH QUESTIONNAIRE - PHQ9
SUM OF ALL RESPONSES TO PHQ QUESTIONS 1-9: 0
2. FEELING DOWN, DEPRESSED OR HOPELESS: 0
SUM OF ALL RESPONSES TO PHQ9 QUESTIONS 1 & 2: 0
6. FEELING BAD ABOUT YOURSELF - OR THAT YOU ARE A FAILURE OR HAVE LET YOURSELF OR YOUR FAMILY DOWN: 0
SUM OF ALL RESPONSES TO PHQ QUESTIONS 1-9: 0
8. MOVING OR SPEAKING SO SLOWLY THAT OTHER PEOPLE COULD HAVE NOTICED. OR THE OPPOSITE, BEING SO FIGETY OR RESTLESS THAT YOU HAVE BEEN MOVING AROUND A LOT MORE THAN USUAL: 0
9. THOUGHTS THAT YOU WOULD BE BETTER OFF DEAD, OR OF HURTING YOURSELF: 0
4. FEELING TIRED OR HAVING LITTLE ENERGY: 0
7. TROUBLE CONCENTRATING ON THINGS, SUCH AS READING THE NEWSPAPER OR WATCHING TELEVISION: 0
1. LITTLE INTEREST OR PLEASURE IN DOING THINGS: 0
10. IF YOU CHECKED OFF ANY PROBLEMS, HOW DIFFICULT HAVE THESE PROBLEMS MADE IT FOR YOU TO DO YOUR WORK, TAKE CARE OF THINGS AT HOME, OR GET ALONG WITH OTHER PEOPLE: 0
SUM OF ALL RESPONSES TO PHQ QUESTIONS 1-9: 0
5. POOR APPETITE OR OVEREATING: 0
3. TROUBLE FALLING OR STAYING ASLEEP: 0
SUM OF ALL RESPONSES TO PHQ QUESTIONS 1-9: 0

## 2022-11-29 ASSESSMENT — SOCIAL DETERMINANTS OF HEALTH (SDOH): HOW HARD IS IT FOR YOU TO PAY FOR THE VERY BASICS LIKE FOOD, HOUSING, MEDICAL CARE, AND HEATING?: NOT HARD AT ALL

## 2022-11-29 ASSESSMENT — LIFESTYLE VARIABLES
HOW MANY STANDARD DRINKS CONTAINING ALCOHOL DO YOU HAVE ON A TYPICAL DAY: 1 OR 2
HOW OFTEN DO YOU HAVE A DRINK CONTAINING ALCOHOL: 2-4 TIMES A MONTH

## 2022-11-29 ASSESSMENT — ENCOUNTER SYMPTOMS: SHORTNESS OF BREATH: 0

## 2022-11-29 NOTE — PROGRESS NOTES
ESTABLISHED PRIMARY CARE VISIT    22  Name: Yanni Trammell   : 1935   Age: 81 y.o.   Sex: female        Assessment & Plan:     Problem List Items Addressed This Visit          Circulatory    Mild aortic valve sclerosis     History of aortic valve sclerosis and regurgitation  Last echo several years ago  Murmur heard again today  Recheck echo, patient had to reschedule due to being in [place]         Relevant Medications    fenofibrate micronized (ANTARA) 43 MG capsule    Other Relevant Orders    Echocardiogram complete    Moderate aortic valve regurgitation     Recheck echo         Relevant Medications    fenofibrate micronized (ANTARA) 43 MG capsule    Other Relevant Orders    Echocardiogram complete       Respiratory    Other emphysema (HCC)     Chronic, controlled  Continue albuterol as needed            Digestive    GERD (gastroesophageal reflux disease)     Controlled  Continue PPI            Genitourinary    Stage 3b chronic kidney disease (CKD) ([facility] [address] - Primary     Previously stage 3a  Possibly worsening vs mild KIKO due to dehydration from fasting  Recheck         Relevant Orders    Renal Function Panel    Urinalysis       Other    Elevated liver function tests     Slightly improved  Patient declines further workup at this time         Generalized anxiety disorder     Chronic, controlled  Continue Celexa 20mg daily         Relevant Medications    citalopram (CELEXA) 20 MG tablet    Mixed hyperlipidemia     Elevated triglycerides and LDL, only minimal improvement  Intolerance of statins  Continue fenofibrate, would not increase due to decreased renal function         Relevant Medications    fenofibrate micronized (ANTARA) 43 MG capsule     Other Visit Diagnoses       Vulvar lesion        Given itching and erythema surrounding excoriation, trial empiric anti-fungal  Consider topical steroid  If does not improve, will refer for biopsy    Relevant Medications    clotrimazole (LOTRIMIN) 1 % vaginal cream    Right ear impacted cerumen        Removed with curette  Tolerated well    Relevant Orders    MD REMOVAL IMPACTED CERUMEN INSTRUMENTATION UNILAT (Completed)          Counseled patient regarding above diagnosis, including possible risks and complications, especially if left uncontrolled. Counseled patient as appropriate and relevant regarding any possible side effects, risks, and alternatives to treatment; the patient verbalizes understanding, and is in agreement with the plan as detailed above. All educational materials and instructions were discussed and included on the After Visit Summary. All questions answered to the patient's satisfaction. The patient was advised to call or send [employer] message for any concerns prior to next appointment. Return in about 6 weeks (around 1/10/2023) for knee injections. Subjective:     Chief Complaint   Patient presents with    Discuss Labs     Patient returns for follow-up  Anxiety improved  Friends and family helped her in [address] new home in [place]  Boyfriend doesn't talk to her, his dementia getting worse    Wasn't able to get echo before she left for [facility]    Notes sore on vaginal area for months, won't heal    Review of Systems   Eyes:  Negative for visual disturbance. Respiratory:  Negative for shortness of breath. Cardiovascular:  Positive for leg swelling. Negative for chest pain and palpitations. Genitourinary:  Positive for urgency and vaginal pain (sore, itching). Negative for difficulty urinating, dyspareunia, vaginal bleeding and vaginal discharge. Musculoskeletal:  Positive for arthralgias. Neurological:  Negative for headaches. Psychiatric/Behavioral:  Negative for dysphoric mood, sleep disturbance and suicidal ideas. The patient is not nervous/anxious.       Medications:     Current Outpatient Medications:     XARELTO 15 MG TABS tablet, Take 1 tablet by mouth daily, Disp: 30 tablet, Rfl: 5    fenofibrate micronized (ANTARA) 43 MG capsule, TAKE 1 CAPSULE BY MOUTH ONCE DAILY IN THE MORNING BEFORE BREAKFAST, Disp: 90 capsule, Rfl: 0    pantoprazole (PROTONIX) 40 MG tablet, Take 1 tablet by mouth daily, Disp: 90 tablet, Rfl: 1    citalopram (CELEXA) 20 mg tablet, Take 1 tablet by mouth daily, Disp: 90 tablet, Rfl: 1    cetirizine (ZYRTEC) 10 MG tablet, Take 1 tablet by mouth daily, Disp: 90 tablet, Rfl: 1    diclofenac sodium (VOLTAREN) 1 % GEL, Apply 4 g topically 4 times daily as needed for Pain, Disp: 150 g, Rfl: 0    bisacodyl (DULCOLAX) 5 MG EC tablet, Take 5 mg by mouth daily as needed, Disp: , Rfl:     Glycerin, Laxative, (GLYCERIN ADULT) 2 g SUPP, Place rectally, Disp: , Rfl:     Rubber Goods (ENEMA BOTTLE) MISC, by Does not apply route, Disp: , Rfl:     ALPRAZolam (XANAX) 0.25 MG tablet, Take 1 tablet by mouth daily as needed. , Disp: , Rfl:     fluticasone (FLONASE) 50 MCG/ACT nasal spray, , Disp: , Rfl:     Bacillus Coagulans-Inulin (PROBIOTIC) 1-250 BILLION-MG CAPS, Take 1 capsule by mouth daily, Disp: , Rfl:     albuterol sulfate HFA (VENTOLIN HFA) 108 (90 Base) MCG/ACT inhaler, Inhale 2 puffs into the lungs every 4 hours as needed for Wheezing or Shortness of Breath (coughing), Disp: 18 g, Rfl: 0    Physical Exam:     Vitals:    11/29/22 1302   BP: 136/84   Site: Right Upper Arm   Position: Sitting   Cuff Size: Large Adult   Pulse: 84   Temp: 97.4 °F (36.3 °C)   SpO2: 93%   Weight: 194 lb (88 kg)   Height: 5' 7\" (1.702 m)     BP Readings from Last 3 Encounters:   11/29/22 136/84   05/31/22 126/70   04/26/22 136/74     Wt Readings from Last 3 Encounters:   11/29/22 194 lb (88 kg)   05/31/22 187 lb 6.4 oz (85 kg)   04/26/22 186 lb 12.8 oz (84.7 kg)     Physical Exam  Vitals and nursing note reviewed. Constitutional:       General: She is not in acute distress. Appearance: Normal appearance. She is overweight. She is not ill-appearing or diaphoretic. Eyes:      Extraocular Movements: Extraocular movements intact. Conjunctiva/sclera: Conjunctivae normal.   Neck:      Thyroid: No thyroid mass, thyromegaly or thyroid tenderness. Cardiovascular:      Rate and Rhythm: Normal rate and regular rhythm. Heart sounds: Murmur heard. Systolic murmur is present with a grade of 3/6. Pulmonary:      Effort: Pulmonary effort is normal. No respiratory distress. Breath sounds: Normal breath sounds. Abdominal:      General: Abdomen is flat. There is no distension. Palpations: Abdomen is soft. Tenderness: There is no abdominal tenderness. There is no right CVA tenderness or left CVA tenderness. Genitourinary:         Comments: Linear excoriation/fissure between left labia majora and perineum. Surrounding minimal erythema without induration, drainage, fluctuance. No abnormal discharge at vaginal introitus. Skin:     General: Skin is warm and dry. Neurological:      Mental Status: She is alert and oriented to person, place, and time. Testing:     Orders Placed This Encounter   Procedures    Renal Function Panel     Standing Status:   Future     Standing Expiration Date:   11/29/2023    Urinalysis     Standing Status:   Future     Standing Expiration Date:   11/29/2023    Echocardiogram complete     Standing Status:   Future     Standing Expiration Date:   1/28/2023     Scheduling Instructions:      [facility]     Order Specific Question:   Reason for exam:     Answer:   new murmur on exam    [facility] REMOVAL IMPACTED CERUMEN INSTRUMENTATION UNILAT      No results found for this or any previous visit (from the past 24 hour(s)).

## 2022-11-29 NOTE — PROGRESS NOTES
Medicare Annual Wellness Visit    Yasmeen Lopez is here for Medicare AWV    Assessment & Plan   Medicare annual wellness visit, subsequent  Counseling regarding advanced directives and goals of care  Screening mammogram for breast cancer  -     JOSE SAMARIA DIGITAL SCREEN BILATERAL PER PROTOCOL; Future    Recommendations for Preventive Services Due: see orders and patient instructions/AVS.  Recommended screening schedule for the next 5-10 years is provided to the patient in written form: see Patient Instructions/AVS.     Return for Medicare Annual Wellness Visit in 1 year. Subjective   Patient returns for medicare wellness and follow-up    Patient's complete Health Risk Assessment and screening values have been reviewed and are found in Flowsheets. The following problems were reviewed today and where indicated follow up appointments were made and/or referrals ordered.     Positive Risk Factor Screenings with Interventions:             General Health and ACP:  General  In general, how would you say your health is?: Very Good  In the past 7 days, have you experienced any of the following: New or Increased Pain, New or Increased Fatigue, Loneliness, Social Isolation, Stress or Anger?: No  Do you get the social and emotional support that you need?: Yes  Do you have a Living Will?: Yes    Advance Directives       Power of  Living Will ACP-Advance Directive ACP-Power of Herb Bolus on 11/30/21 Not on File Not on File Filed          General Health Risk Interventions:  No Living Will: [address] addressed with patient today    Health Habits/Nutrition:  Physical Activity: Insufficiently Active    Days of Exercise per Week: 1 day    Minutes of Exercise per Session: 10 min     Have you lost any weight without trying in the past 3 months?: No  Body mass index: (!) 30.38  Have you seen the dentist within the past year?: Yes  Health Habits/Nutrition Interventions:  Inadequate physical activity:  patient agrees to increase physical activity as follows: will look into wellness center  Nutritional issues:  educational materials for healthy, well-balanced diet provided             Objective   Vitals:    11/29/22 1321   BP: 136/84   Site: Right Upper Arm   Position: Sitting   Cuff Size: Large Adult   Pulse: 84   Temp: 97.4 °F (36.3 °C)   SpO2: 93%   Weight: 194 lb (88 kg)   Height: 5' 7\" (1.702 m)      Body mass index is 30.38 kg/m². Allergies   Allergen Reactions    Gluten Diarrhea    Gluten Meal Diarrhea    Statins      Muscle cramps     Prior to Visit Medications    Medication Sig Taking? Authorizing Provider   XARELTO 15 MG TABS tablet Take 1 tablet by mouth daily  Vicky Zurita MD   fenofibrate micronized (ANTARA) 43 MG capsule TAKE 1 CAPSULE BY MOUTH ONCE DAILY IN THE MORNING BEFORE Yefri Morley MD   pantoprazole (PROTONIX) 40 MG tablet Take 1 tablet by mouth daily  Vicky Zurita MD   citalopram (CELEXA) 20 mg tablet Take 1 tablet by mouth daily  Vicky Zurita MD   cetirizine (ZYRTEC) 10 MG tablet Take 1 tablet by mouth daily  Vicky Zurita MD   diclofenac sodium (VOLTAREN) 1 % GEL Apply 4 g topically 4 times daily as needed for Pain  Vicky Zurita MD   bisacodyl (DULCOLAX) 5 MG EC tablet Take 5 mg by mouth daily as needed  Historical Provider, MD   Glycerin, Laxative, (GLYCERIN ADULT) 2 g SUPP Place rectally  Historical Provider, MD   Rubber Goods (ENEMA BOTTLE) MISC by Does not apply route  Historical Provider, MD   ALPRAZolam Silvino Sicard) 0.25 MG tablet Take 1 tablet by mouth daily as needed.   Historical Provider, MD   fluticasone (FLONASE) 50 MCG/ACT nasal spray   Historical Provider, MD   Bacillus Coagulans-Inulin (PROBIOTIC) 1-250 BILLION-MG CAPS Take 1 capsule by mouth daily  Historical Provider, MD   albuterol sulfate HFA (VENTOLIN HFA) 108 (90 Base) MCG/ACT inhaler Inhale 2 puffs into the lungs every 4 hours as needed for Wheezing or Shortness of Breath (coughing)  Joy Campbell MD       CareTeam (Including outside providers/suppliers regularly involved in providing care):   Patient Care Team:  Joy Campbell MD as PCP - General (Family Medicine)  Joy Campbell MD as PCP - [address] [place] [address] Dr Sanderson Provider     Reviewed and updated this visit:  Meds

## 2022-11-29 NOTE — PATIENT INSTRUCTIONS
I do not recall and could not find specific reference to the very focal maximum location for pain that he describes.  I don't have other suggestions for specific treatment of pain with the medications typically used by neurologists..... other than sending him to pain management.  Since he has myeloma I wonder if what he is describing is superimposed on top of his history of shingles.  I think I just need to look at him again and try to figure out where exactly this pain is coming from because it doesn't fit with what I had thought previously.    Justins   Personalized Preventive Plan for Marko Reagan - 11/29/2022  Medicare offers a range of preventive health benefits. Some of the tests and screenings are paid in full while other may be subject to a deductible, co-insurance, and/or copay. Some of these benefits include a comprehensive review of your medical history including lifestyle, illnesses that may run in your family, and various assessments and screenings as appropriate. After reviewing your medical record and screening and assessments performed today your provider may have ordered immunizations, labs, imaging, and/or referrals for you. A list of these orders (if applicable) as well as your Preventive Care list are included within your After Visit Summary for your review. Other Preventive Recommendations:    A preventive eye exam performed by an eye specialist is recommended every 1-2 years to screen for glaucoma; cataracts, macular degeneration, and other eye disorders. A preventive dental visit is recommended every 6 months. Try to get at least 150 minutes of exercise per week or 10,000 steps per day on a pedometer . Order or download the FREE \"Exercise & Physical Activity: Your Everyday Guide\" from [employer]. Call 3-695.909.4664 or search [employer] online. You need 0407-7809 mg of calcium and 0792-2302 IU of vitamin D per day. It is possible to meet your calcium requirement with diet alone, but a vitamin D supplement is usually necessary to meet this goal.  When exposed to the sun, use a sunscreen that protects against both UVA and UVB radiation with an SPF of 30 or greater. Reapply every 2 to 3 hours or after sweating, drying off with a towel, or swimming. Always wear a seat belt when traveling in a car. Always wear a helmet when riding a bicycle or motorcycle.

## 2022-12-01 NOTE — ASSESSMENT & PLAN NOTE
History of aortic valve sclerosis and regurgitation  Last echo several years ago  Murmur heard again today  Recheck echo, patient had to reschedule due to being in [facility]

## 2022-12-01 NOTE — ASSESSMENT & PLAN NOTE
Elevated triglycerides and LDL, only minimal improvement  Intolerance of statins  Continue fenofibrate, would not increase due to decreased renal function

## 2022-12-02 DIAGNOSIS — N18.32 STAGE 3B CHRONIC KIDNEY DISEASE (CKD) (HCC): ICD-10-CM

## 2022-12-02 LAB
BACTERIA: ABNORMAL /HPF
BILIRUBIN URINE: NEGATIVE
BLOOD, URINE: NEGATIVE
CLARITY: CLEAR
COLOR: YELLOW
EPITHELIAL CELLS, UA: ABNORMAL /HPF
GLUCOSE URINE: NEGATIVE MG/DL
KETONES, URINE: NEGATIVE MG/DL
LEUKOCYTE ESTERASE, URINE: ABNORMAL
MUCUS: PRESENT /LPF
NITRITE, URINE: NEGATIVE
PH UA: 5.5 (ref 5–9)
PROTEIN UA: NEGATIVE MG/DL
RBC UA: ABNORMAL /HPF (ref 0–2)
RENAL EPITHELIAL, UA: ABNORMAL /HPF
SPECIFIC GRAVITY UA: 1.02 (ref 1–1.03)
UROBILINOGEN, URINE: 0.2 E.U./DL
WBC UA: ABNORMAL /HPF (ref 0–5)

## 2022-12-05 DIAGNOSIS — R82.81 PYURIA: Primary | ICD-10-CM

## 2022-12-05 DIAGNOSIS — N18.32 STAGE 3B CHRONIC KIDNEY DISEASE (CKD) (HCC): ICD-10-CM

## 2022-12-05 LAB
ALBUMIN SERPL-MCNC: 4.2 G/DL (ref 3.5–5.2)
ANION GAP SERPL CALCULATED.3IONS-SCNC: 11 MMOL/L (ref 7–16)
BUN BLDV-MCNC: 16 MG/DL (ref 6–23)
CALCIUM SERPL-MCNC: 9.2 MG/DL (ref 8.6–10.2)
CHLORIDE BLD-SCNC: 103 MMOL/L (ref 98–107)
CO2: 25 MMOL/L (ref 22–29)
CREAT SERPL-MCNC: 1.2 MG/DL (ref 0.5–1)
GFR SERPL CREATININE-BSD FRML MDRD: 44 ML/MIN/1.73
GLUCOSE BLD-MCNC: 103 MG/DL (ref 74–99)
PHOSPHORUS: 4 MG/DL (ref 2.5–4.5)
POTASSIUM SERPL-SCNC: 4.7 MMOL/L (ref 3.5–5)
SODIUM BLD-SCNC: 139 MMOL/L (ref 132–146)

## 2022-12-06 ENCOUNTER — TELEPHONE (OUTPATIENT)
Dept: PRIMARY CARE CLINIC | Age: 87
End: 2022-12-06

## 2022-12-06 NOTE — TELEPHONE ENCOUNTER
[facility] called, stated they got a request to add a urine culture to her testing. They said that they no longer had her urine specimen and could not run the test. She did try to call the [facility] office but was not able to get through.

## 2022-12-09 LAB
LEFT VENTRICULAR EJECTION FRACTION MODE: NORMAL
LV EF: NORMAL %

## 2022-12-12 DIAGNOSIS — R82.81 PYURIA: ICD-10-CM

## 2022-12-15 DIAGNOSIS — Z12.31 SCREENING MAMMOGRAM FOR BREAST CANCER: ICD-10-CM

## 2022-12-15 LAB — URINE CULTURE, ROUTINE: NORMAL

## 2022-12-16 DIAGNOSIS — I48.0 PAROXYSMAL ATRIAL FIBRILLATION (HCC): ICD-10-CM

## 2022-12-19 RX ORDER — RIVAROXABAN 15 MG/1
TABLET, FILM COATED ORAL
Qty: 30 TABLET | Refills: 5 | Status: SHIPPED | OUTPATIENT
Start: 2022-12-19

## 2023-02-09 RX ORDER — PANTOPRAZOLE SODIUM 40 MG/1
40 TABLET, DELAYED RELEASE ORAL DAILY
Qty: 90 TABLET | Refills: 1 | Status: SHIPPED | OUTPATIENT
Start: 2023-02-09

## 2023-02-09 NOTE — TELEPHONE ENCOUNTER
HPI Comments: Javed Blake, 38 y.o. Male with PMHx of CHF / HTN / DM / PNA, presents ambulatory to [facility] ED with cc of productive cough with dark yellow to green sputum x2 days. He denies seeing blood in the sputum. He denies feeling short of breath today, but does note occasionally he feels SOB at night. Denies fever. Denies hx of thyroid problems. PCP: Debo Ceron MD    Social history significant for: - Tobacco, - EtOH, - Illicit Drug Use    There are no other complaints, changes, or physical findings at this time. The history is provided by the patient. Past Medical History:   Diagnosis Date    Arrhythmia     afib    Asthma     3/2013 last attack    CKD (chronic kidney disease), stage III 2/12/2013    Nephrology: Micah Jones MD    Diabetes [facility])     Gout     Heart failure ([facility] [address]     Hyperlipidemia     Hypertension     Ill-defined condition     Positive PPD, treated 2001    treated 9 months       Past Surgical History:   Procedure Laterality Date    HX BELOW KNEE AMPUTATION Left     due to osteomyelitis    HX HEENT      tonsilectomy    HX HERNIA REPAIR      HX TONSILLECTOMY           Family History:   Problem Relation Age of Onset    Diabetes Mother     Hypertension Mother     Hypertension Brother     Diabetes Brother     Diabetes Maternal Grandmother     Hypertension Maternal Grandmother     Diabetes Other     Hypertension Other     Heart Disease Other        Social History     Social History    Marital status:      Spouse name: N/A    Number of children: N/A    Years of education: N/A     Occupational History    Not on file.      Social History Main Topics    Smoking status: Never Smoker    Smokeless tobacco: Never Used    Alcohol use No    Drug use: No    Sexual activity: Not Currently     Partners: Female     Other Topics Concern    Not on file     Social History Narrative         Per conversation 03/26/17    He would like his new Medical  power Last OV 11/29/23   Next OV 2/20/23 of  to be his Brother Michael Atkins. (Previously was his Mother Lashon Ahuja)        Never smoker         ALLERGIES: Ace inhibitors    Review of Systems   Constitutional: Negative for fever and unexpected weight change. HENT: Positive for congestion. Negative for sore throat. Respiratory: Positive for cough (productive). Negative for shortness of breath. Cardiovascular: Negative for chest pain (or TAMEZ.). Gastrointestinal: Negative for diarrhea, nausea and vomiting. Endocrine: Negative for polydipsia, polyphagia and polyuria. Genitourinary: Negative for dysuria and frequency. Musculoskeletal: Negative for arthralgias and myalgias. Skin: Negative for color change. Allergic/Immunologic: Negative for environmental allergies. Neurological: Negative for speech difficulty and headaches. Hematological: Does not bruise/bleed easily. Psychiatric/Behavioral: Negative for hallucinations. Patient Vitals for the past 12 hrs:   Temp Pulse Resp BP SpO2   07/04/17 0100 - - - 168/82 96 %   07/04/17 0030 - - - 178/82 98 %   07/04/17 0002 - - - - 97 %   07/04/17 0001 - - - 175/74 -   07/03/17 2337 - - - - (!) 89 %   07/03/17 2253 99 °F (37.2 °C) 90 20 191/90 (!) 87 %         Physical Exam   HENT:   Mouth/Throat: Oropharynx is clear and moist and mucous membranes are normal.   Neck: Neck supple. Cardiovascular: Normal rate, regular rhythm and normal heart sounds. Exam reveals no gallop and no friction rub. No murmur heard. Pulmonary/Chest: Effort normal. No respiratory distress. He has no wheezes. He has no rhonchi. He has rales (bi-basalar). Good air movement   Abdominal: Soft. Bowel sounds are normal. He exhibits no distension. There is no tenderness. Musculoskeletal: Normal range of motion. Bilateral AKA   Lymphadenopathy:     He has no cervical adenopathy. Neurological: No cranial nerve deficit or sensory deficit. Skin: Skin is warm. No lesion noted.    Psychiatric: He has a normal mood and affect. Thought content normal.        MDM  Number of Diagnoses or Management Options  Diagnosis management comments: P/O pneumonia, CHF. Unlikely COPD, unlikely asthma exacerbation       Amount and/or Complexity of Data Reviewed  Clinical lab tests: ordered and reviewed  Tests in the radiology section of CPT®: ordered and reviewed  Tests in the medicine section of CPT®: reviewed and ordered  Review and summarize past medical records: yes  Discuss the patient with other providers: yes (Cardiology, Hospitalist)  Independent visualization of images, tracings, or specimens: yes      ED Course       Procedures    EKG interpretation: (Preliminary) 21:06  Rhythm: normal sinus rhythm; and regular . Rate (approx.): 69; Axis: normal; LA interval: normal; Other findings: prolonged QT interval.      EKG interpretation: 23:38  Rhythm: normal sinus rhythm; and regular . Rate (approx.): 87; Axis: normal; LA interval: normal; QRS interval: normal ; ST/T wave: non-specific changes; Other findings: abnormal ekg. Consult Note:  12:55 AM  Vazquez Umana MD spoke with Dr. David Ornelas MD,  Specialty: Cardiology  Discussed pt's hx, disposition, and available diagnostic and imaging results. Reviewed care plans. Consultant agrees with plans as outlined. Advises admission for patient. Consult Note:  1:20 AM  Vazquez Umana MD spoke with Dr. Xiao Garcia MD,  Specialty: Hospitalist  Discussed pt's hx, disposition, and available diagnostic and imaging results. Reviewed care plans. Consultant agrees with plans as outlined. Will admit patient.     LABORATORY TESTS:  Recent Results (from the past 12 hour(s))   CBC W/O DIFF    Collection Time: 07/03/17 11:16 PM   Result Value Ref Range    WBC 8.8 4.1 - 11.1 K/uL    RBC 3.55 (L) 4.10 - 5.70 M/uL    HGB 9.2 (L) 12.1 - 17.0 g/dL    HCT 30.1 (L) 36.6 - 50.3 %    MCV 84.8 80.0 - 99.0 FL    MCH 25.9 (L) 26.0 - 34.0 PG    MCHC 30.6 30.0 - 36.5 g/dL    RDW 15.8 (H) 11.5 - 14.5 % PLATELET 931 438 - 952 K/uL   METABOLIC PANEL, BASIC    Collection Time: 07/03/17 11:16 PM   Result Value Ref Range    Sodium 143 136 - 145 mmol/L    Potassium 4.1 3.5 - 5.1 mmol/L    Chloride 108 97 - 108 mmol/L    CO2 25 21 - 32 mmol/L    Anion gap 10 5 - 15 mmol/L    Glucose 173 (H) 65 - 100 mg/dL    BUN 62 (H) 6 - 20 MG/DL    Creatinine 3.90 (H) 0.70 - 1.30 MG/DL    BUN/Creatinine ratio 16 12 - 20      GFR est AA 21 (L) >60 ml/min/1.73m2    GFR est non-AA 17 (L) >60 ml/min/1.73m2    Calcium 8.2 (L) 8.5 - 10.1 MG/DL   TROPONIN I    Collection Time: 07/03/17 11:16 PM   Result Value Ref Range    Troponin-I, Qt. <0.04 <0.05 ng/mL   CK W/ CKMB & INDEX    Collection Time: 07/03/17 11:16 PM   Result Value Ref Range     (H) 39 - 308 U/L    CK - MB 3.6 (H) <3.6 NG/ML    CK-MB Index 0.4 0.0 - 2.5     PRO-BNP    Collection Time: 07/03/17 11:16 PM   Result Value Ref Range    NT pro- (H) 0 - 125 PG/ML       IMAGING RESULTS:  XR CHEST PA LAT   Final Result   CXR Results  (Last 48 hours)               07/04/17 0000  XR CHEST PA LAT Final result    Impression:  IMPRESSION: No acute process           Narrative:  INDICATION:  chest pain        COMPARISON: 3/25/2017       FINDINGS: PA and lateral views of the chest demonstrate a stable   cardiomediastinal silhouette and clear lungs bilaterally. The visualized osseous   structures are unremarkable. IMPRESSION:  1. Systolic congestive heart failure, unspecified congestive heart failure chronicity ([address]        PLAN:  1. Admit to Hospitalist.    Admit Note:  1:20 AM  Pt is being admitted by Dr. Tali Crowell MD. The results of their tests and reason(s) for their admission have been discussed with pt and/or available family. They convey agreement and understanding for the need to be admitted and for admission diagnosis.     This note is prepared by Jena Tejeda, acting as a Scribe for Cici Olson MD.    Cici Olson MD: The elicia's documentation has been prepared under my direction and personally reviewed by me in its entirety. I confirm that the notes above accurately reflects all work, treatment, procedures, and medical decision making performed by me.

## 2023-02-20 ENCOUNTER — OFFICE VISIT (OUTPATIENT)
Dept: PRIMARY CARE CLINIC | Age: 88
End: 2023-02-20

## 2023-02-20 VITALS
OXYGEN SATURATION: 95 % | DIASTOLIC BLOOD PRESSURE: 76 MMHG | RESPIRATION RATE: 16 BRPM | WEIGHT: 192 LBS | SYSTOLIC BLOOD PRESSURE: 132 MMHG | HEART RATE: 77 BPM | HEIGHT: 67 IN | BODY MASS INDEX: 30.13 KG/M2

## 2023-02-20 DIAGNOSIS — J43.8 OTHER EMPHYSEMA (HCC): ICD-10-CM

## 2023-02-20 DIAGNOSIS — R82.81 PYURIA: ICD-10-CM

## 2023-02-20 DIAGNOSIS — J30.2 SEASONAL ALLERGIC RHINITIS, UNSPECIFIED TRIGGER: ICD-10-CM

## 2023-02-20 DIAGNOSIS — I48.0 PAROXYSMAL ATRIAL FIBRILLATION (HCC): ICD-10-CM

## 2023-02-20 DIAGNOSIS — N18.32 STAGE 3B CHRONIC KIDNEY DISEASE (CKD) (HCC): ICD-10-CM

## 2023-02-20 DIAGNOSIS — M17.0 BILATERAL PRIMARY OSTEOARTHRITIS OF KNEE: Primary | ICD-10-CM

## 2023-02-20 DIAGNOSIS — N90.89 VULVAR LESION: ICD-10-CM

## 2023-02-20 PROBLEM — J45.909 ASTHMA DUE TO ENVIRONMENTAL ALLERGIES: Status: ACTIVE | Noted: 2023-02-20

## 2023-02-20 LAB
BACTERIA: ABNORMAL /HPF
BILIRUBIN URINE: NEGATIVE
BLOOD, URINE: NEGATIVE
CLARITY: CLEAR
COLOR: YELLOW
EPITHELIAL CELLS, UA: ABNORMAL /HPF
GLUCOSE URINE: NEGATIVE MG/DL
KETONES, URINE: NEGATIVE MG/DL
LEUKOCYTE ESTERASE, URINE: ABNORMAL
NITRITE, URINE: NEGATIVE
PH UA: 5.5 (ref 5–9)
PROTEIN UA: NEGATIVE MG/DL
RBC UA: ABNORMAL /HPF (ref 0–2)
SPECIFIC GRAVITY UA: 1.02 (ref 1–1.03)
UROBILINOGEN, URINE: 0.2 E.U./DL
WBC UA: ABNORMAL /HPF (ref 0–5)

## 2023-02-20 RX ORDER — PANTOPRAZOLE SODIUM 40 MG/1
40 TABLET, DELAYED RELEASE ORAL DAILY
Qty: 90 TABLET | Refills: 1 | Status: CANCELLED | OUTPATIENT
Start: 2023-02-20

## 2023-02-20 RX ORDER — CLOTRIMAZOLE 1 %
CREAM WITH APPLICATOR VAGINAL
Qty: 45 G | Refills: 0 | Status: CANCELLED | OUTPATIENT
Start: 2023-02-20

## 2023-02-20 RX ORDER — TRIAMCINOLONE ACETONIDE 40 MG/ML
40 INJECTION, SUSPENSION INTRA-ARTICULAR; INTRAMUSCULAR ONCE
Status: COMPLETED | OUTPATIENT
Start: 2023-02-20 | End: 2023-02-20

## 2023-02-20 RX ORDER — ALBUTEROL SULFATE 90 UG/1
2 AEROSOL, METERED RESPIRATORY (INHALATION) EVERY 4 HOURS PRN
Qty: 18 G | Refills: 0 | Status: CANCELLED | OUTPATIENT
Start: 2023-02-20

## 2023-02-20 RX ORDER — CETIRIZINE HYDROCHLORIDE 10 MG/1
10 TABLET ORAL DAILY
Qty: 90 TABLET | Refills: 1 | Status: SHIPPED | OUTPATIENT
Start: 2023-02-20

## 2023-02-20 RX ORDER — CITALOPRAM 20 MG/1
20 TABLET ORAL DAILY
Qty: 90 TABLET | Refills: 1 | Status: CANCELLED | OUTPATIENT
Start: 2023-02-20

## 2023-02-20 RX ORDER — LIDOCAINE HYDROCHLORIDE 20 MG/ML
4 INJECTION, SOLUTION INFILTRATION; PERINEURAL ONCE
Status: COMPLETED | OUTPATIENT
Start: 2023-02-20 | End: 2023-02-20

## 2023-02-20 RX ORDER — RIVAROXABAN 15 MG/1
TABLET, FILM COATED ORAL
Qty: 30 TABLET | Refills: 5 | Status: CANCELLED | OUTPATIENT
Start: 2023-02-20

## 2023-02-20 RX ORDER — DORZOLAMIDE HCL 20 MG/ML
SOLUTION/ DROPS OPHTHALMIC
COMMUNITY
Start: 2023-02-14

## 2023-02-20 RX ORDER — FENOFIBRATE 43 MG/1
43 CAPSULE ORAL
Qty: 90 CAPSULE | Refills: 1 | Status: CANCELLED | OUTPATIENT
Start: 2023-02-20

## 2023-02-20 RX ADMIN — LIDOCAINE HYDROCHLORIDE 4 ML: 20 INJECTION, SOLUTION INFILTRATION; PERINEURAL at 18:18

## 2023-02-20 RX ADMIN — TRIAMCINOLONE ACETONIDE 40 MG: 40 INJECTION, SUSPENSION INTRA-ARTICULAR; INTRAMUSCULAR at 17:43

## 2023-02-20 RX ADMIN — LIDOCAINE HYDROCHLORIDE 4 ML: 20 INJECTION, SOLUTION INFILTRATION; PERINEURAL at 18:19

## 2023-02-20 RX ADMIN — TRIAMCINOLONE ACETONIDE 40 MG: 40 INJECTION, SUSPENSION INTRA-ARTICULAR; INTRAMUSCULAR at 17:44

## 2023-02-20 SDOH — ECONOMIC STABILITY: HOUSING INSECURITY
IN THE LAST 12 MONTHS, WAS THERE A TIME WHEN YOU DID NOT HAVE A STEADY PLACE TO SLEEP OR SLEPT IN A SHELTER (INCLUDING NOW)?: NO

## 2023-02-20 SDOH — ECONOMIC STABILITY: FOOD INSECURITY: WITHIN THE PAST 12 MONTHS, YOU WORRIED THAT YOUR FOOD WOULD RUN OUT BEFORE YOU GOT MONEY TO BUY MORE.: NEVER TRUE

## 2023-02-20 SDOH — ECONOMIC STABILITY: FOOD INSECURITY: WITHIN THE PAST 12 MONTHS, THE FOOD YOU BOUGHT JUST DIDN'T LAST AND YOU DIDN'T HAVE MONEY TO GET MORE.: NEVER TRUE

## 2023-02-20 SDOH — ECONOMIC STABILITY: INCOME INSECURITY: HOW HARD IS IT FOR YOU TO PAY FOR THE VERY BASICS LIKE FOOD, HOUSING, MEDICAL CARE, AND HEATING?: NOT HARD AT ALL

## 2023-02-20 ASSESSMENT — ENCOUNTER SYMPTOMS: SHORTNESS OF BREATH: 0

## 2023-02-20 ASSESSMENT — PATIENT HEALTH QUESTIONNAIRE - PHQ9
SUM OF ALL RESPONSES TO PHQ QUESTIONS 1-9: 0
SUM OF ALL RESPONSES TO PHQ QUESTIONS 1-9: 0
2. FEELING DOWN, DEPRESSED OR HOPELESS: 0
SUM OF ALL RESPONSES TO PHQ QUESTIONS 1-9: 0
SUM OF ALL RESPONSES TO PHQ QUESTIONS 1-9: 0

## 2023-02-20 NOTE — PROGRESS NOTES
Knee Injection Procedure Note     Pre-operative Diagnosis: bilateral knee osteroarthritis     Post-operative Diagnosis: same     Indications: Symptom relief from osteoarthritis     Anesthesia: Lidocaine 2% without epinephrine     Procedure Details      Verbal and written consent was obtained for the procedure. Timeout performed immediately prior to procedure 15:59. Bilateral knee joint skin prepped with alcohol. The skin was anesthetized with ethyl chloride spray. A 21 gauge needle was inserted into the anterior inferior aspect of each joint from a lateral approach. 4 ml 2% lidocaine and 1 ml of triamcinolone (KENALOG) 40 mg/ml was then injected into each knee with new needle. The needle was removed and the area cleansed and dressed. Complications:  None; patient tolerated the procedure well. Plan:  Counseled on signs of infection and other complications and provided return/ED precautions.

## 2023-02-20 NOTE — PROGRESS NOTES
ESTABLISHED PRIMARY CARE VISIT    23  Name: Virginia Peralta   : 1935   Age: 81 y.o. Sex: female        Assessment & Plan:     Problem List Items Addressed This Visit          Circulatory    Paroxysmal atrial fibrillation (HCC)     Chronic, controlled  Continue Xarelto            Respiratory    Seasonal allergic rhinitis     Continue Zyrtec, Flonase         Relevant Medications    cetirizine (ZYRTEC) 10 MG tablet    Other emphysema (HCC)     Chronic, controlled  Continue albuterol as needed         Relevant Medications    cetirizine (ZYRTEC) 10 MG tablet       Musculoskeletal and Integument    Bilateral primary osteoarthritis of knee - Primary     Patient returns for bilateral knee steroid injections today- last injections 8-9 months ago  See separate procedure note  Declines ortho referral  Continue topical diclofenac as needed         Relevant Medications    diclofenac sodium (VOLTAREN) 1 % GEL       Genitourinary    Stage 3b chronic kidney disease (CKD) ([facility] [address]     Stable  Declines nephro referral          Other Visit Diagnoses       Vulvar lesion        Reports some improvement with antifungal  If recurs, will refer for biopsy    Pyuria        Recheck    Relevant Orders    Urinalysis          Counseled patient regarding above diagnosis, including possible risks and complications, especially if left uncontrolled. Counseled patient as appropriate and relevant regarding any possible side effects, risks, and alternatives to treatment; the patient verbalizes understanding, and is in agreement with the plan as detailed above. All educational materials and instructions were discussed and included on the After Visit Summary. All questions answered to the patient's satisfaction. The patient was advised to call or send [employer] message for any concerns prior to next appointment. Return in about 4 months (around 2023).     Subjective:     Chief Complaint   Patient presents with    Arthritis Patient is here for knee injections     Patient returns for follow-up and knee injections  Knees starting to hurt again, ready for next injection  Still doesn't want to see ortho  Completed echo, no results received to discuss  Vaginal sore improved  Leg swelling worse today but ate salty [place] food yesterday, she will elevate legs tonight and monitor if this does not improve    Review of Systems   Eyes:  Negative for visual disturbance. Respiratory:  Negative for shortness of breath. Cardiovascular:  Positive for leg swelling. Negative for chest pain and palpitations. Genitourinary:  Positive for urgency and vaginal pain (sore, itching). Negative for difficulty urinating, dyspareunia, vaginal bleeding and vaginal discharge. Musculoskeletal:  Positive for arthralgias. Neurological:  Negative for headaches. Medications:     Current Outpatient Medications:     dorzolamide (TRUSOPT) 2 % ophthalmic solution, INSTILL 1 DROPS TWICE DAILY INTO LEFT EYE, Disp: , Rfl:     diclofenac sodium (VOLTAREN) 1 % GEL, Apply 4 g topically 4 times daily as needed for Pain, Disp: 150 g, Rfl: 0    cetirizine (ZYRTEC) 10 MG tablet, Take 1 tablet by mouth daily, Disp: 90 tablet, Rfl: 1    pantoprazole (PROTONIX) 40 MG tablet, Take 1 tablet by mouth daily, Disp: 90 tablet, Rfl: 1    XARELTO 15 MG TABS tablet, TAKE 1 TABLET BY MOUTH EVERY DAY, Disp: 30 tablet, Rfl: 5    citalopram (CELEXA) 20 MG tablet, Take 1 tablet by mouth daily, Disp: 90 tablet, Rfl: 1    fenofibrate micronized (ANTARA) 43 MG capsule, Take 1 capsule by mouth every morning (before breakfast), Disp: 90 capsule, Rfl: 1    clotrimazole (LOTRIMIN) 1 % vaginal cream, Apply topically for 1-2 weeks. , Disp: 45 g, Rfl: 0    bisacodyl (DULCOLAX) 5 MG EC tablet, Take 5 mg by mouth daily as needed, Disp: , Rfl:     Glycerin, Laxative, (GLYCERIN ADULT) 2 g SUPP, Place rectally, Disp: , Rfl:     Rubber Goods (ENEMA BOTTLE) MISC, by Does not apply route, Disp: , Rfl: ALPRAZolam (XANAX) 0.25 MG tablet, Take 1 tablet by mouth daily as needed. , Disp: , Rfl:     fluticasone (FLONASE) 50 MCG/ACT nasal spray, , Disp: , Rfl:     Bacillus Coagulans-Inulin (PROBIOTIC) 1-250 BILLION-MG CAPS, Take 1 capsule by mouth daily, Disp: , Rfl:     albuterol sulfate HFA (VENTOLIN HFA) 108 (90 Base) MCG/ACT inhaler, Inhale 2 puffs into the lungs every 4 hours as needed for Wheezing or Shortness of Breath (coughing), Disp: 18 g, Rfl: 0    Physical Exam:     Vitals:    02/20/23 1526   BP: 132/76   Pulse: 77   Resp: 16   SpO2: 95%   Weight: 192 lb (87.1 kg)   Height: 5' 7\" (1.702 m)     BP Readings from Last 3 Encounters:   02/20/23 132/76   11/29/22 136/84   11/29/22 136/84     Wt Readings from Last 3 Encounters:   02/20/23 192 lb (87.1 kg)   11/29/22 194 lb (88 kg)   11/29/22 194 lb (88 kg)     Physical Exam  Vitals and nursing note reviewed. Constitutional:       General: She is not in acute distress. Appearance: Normal appearance. She is overweight. She is not ill-appearing or diaphoretic. Eyes:      Extraocular Movements: Extraocular movements intact. Conjunctiva/sclera: Conjunctivae normal.   Neck:      Thyroid: No thyroid mass, thyromegaly or thyroid tenderness. Cardiovascular:      Rate and Rhythm: Normal rate and regular rhythm. Heart sounds: Murmur heard. Systolic murmur is present with a grade of 3/6. Pulmonary:      Effort: Pulmonary effort is normal. No respiratory distress. Breath sounds: Normal breath sounds. Abdominal:      General: Abdomen is flat. There is no distension. Palpations: Abdomen is soft. Tenderness: There is no abdominal tenderness. There is no right CVA tenderness or left CVA tenderness. Musculoskeletal:      Right knee: Effusion (small) present. No swelling, erythema, ecchymosis or crepitus. Normal range of motion. Tenderness present over the medial joint line and lateral joint line. No patellar tendon tenderness.       Left knee: Effusion (small) present. No swelling, erythema, ecchymosis or crepitus. Normal range of motion. Tenderness present over the medial joint line and lateral joint line. No patellar tendon tenderness. Right lower le+ Pitting Edema present. Left lower le+ Pitting Edema present. Skin:     General: Skin is warm and dry. Neurological:      Mental Status: She is alert and oriented to person, place, and time. Testing:     Orders Placed This Encounter   Procedures    Urinalysis     Standing Status:   Future     Standing Expiration Date:   2024      No results found for this or any previous visit (from the past 24 hour(s)).

## 2023-02-20 NOTE — ASSESSMENT & PLAN NOTE
Patient returns for bilateral knee steroid injections today- last injections 8-9 months ago  See separate procedure note  Declines ortho referral  Continue topical diclofenac as needed

## 2023-02-24 ENCOUNTER — TELEPHONE (OUTPATIENT)
Dept: PRIMARY CARE CLINIC | Age: 88
End: 2023-02-24

## 2023-02-24 NOTE — TELEPHONE ENCOUNTER
Echo shows mild to moderate aortic stenosis. This is likely the cause of her murmur. Does not require referral to cardiology at this time unless she becomes symptomatic or stenosis becomes more severe. We will need to monitor this about every 2 years.

## 2023-02-24 NOTE — TELEPHONE ENCOUNTER
Patient called in for lab results and results of her ECHO. I read her the lab result, and told her that it looks like we just received the ECHO result yesterday, and we will call her when you have reviewed it.

## 2023-05-12 ENCOUNTER — TELEPHONE (OUTPATIENT)
Dept: PRIMARY CARE CLINIC | Age: 88
End: 2023-05-12

## 2023-05-12 NOTE — TELEPHONE ENCOUNTER
Pt needs in ASAP for knee injections,has appointment in June but can not wait that long. Is willing to go to [facility] if possible.

## 2023-05-26 ENCOUNTER — OFFICE VISIT (OUTPATIENT)
Dept: PRIMARY CARE CLINIC | Age: 88
End: 2023-05-26
Payer: MEDICARE

## 2023-05-26 VITALS
WEIGHT: 191 LBS | OXYGEN SATURATION: 96 % | SYSTOLIC BLOOD PRESSURE: 126 MMHG | HEART RATE: 70 BPM | TEMPERATURE: 98 F | BODY MASS INDEX: 29.98 KG/M2 | HEIGHT: 67 IN | DIASTOLIC BLOOD PRESSURE: 72 MMHG | RESPIRATION RATE: 20 BRPM

## 2023-05-26 DIAGNOSIS — J30.2 SEASONAL ALLERGIC RHINITIS, UNSPECIFIED TRIGGER: ICD-10-CM

## 2023-05-26 DIAGNOSIS — J45.909 ASTHMA DUE TO ENVIRONMENTAL ALLERGIES: ICD-10-CM

## 2023-05-26 DIAGNOSIS — M17.0 BILATERAL PRIMARY OSTEOARTHRITIS OF KNEE: Primary | ICD-10-CM

## 2023-05-26 DIAGNOSIS — J43.8 OTHER EMPHYSEMA (HCC): ICD-10-CM

## 2023-05-26 PROCEDURE — 99999 PR OFFICE/OUTPT VISIT,PROCEDURE ONLY: CPT | Performed by: STUDENT IN AN ORGANIZED HEALTH CARE EDUCATION/TRAINING PROGRAM

## 2023-05-26 PROCEDURE — 20610 DRAIN/INJ JOINT/BURSA W/O US: CPT | Performed by: STUDENT IN AN ORGANIZED HEALTH CARE EDUCATION/TRAINING PROGRAM

## 2023-05-26 RX ORDER — ALBUTEROL SULFATE 90 UG/1
2 AEROSOL, METERED RESPIRATORY (INHALATION) EVERY 4 HOURS PRN
Qty: 18 G | Refills: 5 | Status: SHIPPED | OUTPATIENT
Start: 2023-05-26

## 2023-05-26 RX ORDER — FLUTICASONE PROPIONATE 50 MCG
2 SPRAY, SUSPENSION (ML) NASAL DAILY
Qty: 16 G | Refills: 5 | Status: SHIPPED | OUTPATIENT
Start: 2023-05-26

## 2023-06-05 RX ORDER — TRIAMCINOLONE ACETONIDE 40 MG/ML
40 INJECTION, SUSPENSION INTRA-ARTICULAR; INTRAMUSCULAR ONCE
Status: SHIPPED | OUTPATIENT
Start: 2023-05-26

## 2023-06-21 ENCOUNTER — OFFICE VISIT (OUTPATIENT)
Dept: PRIMARY CARE CLINIC | Age: 88
End: 2023-06-21
Payer: MEDICARE

## 2023-06-21 VITALS
HEIGHT: 67 IN | OXYGEN SATURATION: 96 % | DIASTOLIC BLOOD PRESSURE: 72 MMHG | HEART RATE: 82 BPM | WEIGHT: 191 LBS | RESPIRATION RATE: 20 BRPM | BODY MASS INDEX: 29.98 KG/M2 | SYSTOLIC BLOOD PRESSURE: 124 MMHG | TEMPERATURE: 97.7 F

## 2023-06-21 DIAGNOSIS — M17.0 BILATERAL PRIMARY OSTEOARTHRITIS OF KNEE: Primary | ICD-10-CM

## 2023-06-21 DIAGNOSIS — J43.8 OTHER EMPHYSEMA (HCC): ICD-10-CM

## 2023-06-21 DIAGNOSIS — J30.2 SEASONAL ALLERGIC RHINITIS, UNSPECIFIED TRIGGER: ICD-10-CM

## 2023-06-21 DIAGNOSIS — J45.909 ASTHMA DUE TO ENVIRONMENTAL ALLERGIES: ICD-10-CM

## 2023-06-21 DIAGNOSIS — I48.0 PAROXYSMAL ATRIAL FIBRILLATION (HCC): ICD-10-CM

## 2023-06-21 DIAGNOSIS — H20.9 UVEITIS: ICD-10-CM

## 2023-06-21 DIAGNOSIS — R91.8 PULMONARY NODULES: ICD-10-CM

## 2023-06-21 DIAGNOSIS — F41.1 GENERALIZED ANXIETY DISORDER: ICD-10-CM

## 2023-06-21 DIAGNOSIS — E04.2 MULTINODULAR THYROID: ICD-10-CM

## 2023-06-21 DIAGNOSIS — E78.2 MIXED HYPERLIPIDEMIA: ICD-10-CM

## 2023-06-21 DIAGNOSIS — N18.32 STAGE 3B CHRONIC KIDNEY DISEASE (CKD) (HCC): ICD-10-CM

## 2023-06-21 DIAGNOSIS — M13.0 POLYARTHRITIS: ICD-10-CM

## 2023-06-21 PROCEDURE — 1123F ACP DISCUSS/DSCN MKR DOCD: CPT | Performed by: STUDENT IN AN ORGANIZED HEALTH CARE EDUCATION/TRAINING PROGRAM

## 2023-06-21 PROCEDURE — 99214 OFFICE O/P EST MOD 30 MIN: CPT | Performed by: STUDENT IN AN ORGANIZED HEALTH CARE EDUCATION/TRAINING PROGRAM

## 2023-06-21 RX ORDER — FENOFIBRATE 43 MG/1
43 CAPSULE ORAL
Qty: 90 CAPSULE | Refills: 1 | Status: SHIPPED | OUTPATIENT
Start: 2023-06-21

## 2023-06-21 RX ORDER — RIVAROXABAN 15 MG/1
15 TABLET, FILM COATED ORAL DAILY
Qty: 30 TABLET | Refills: 5 | Status: SHIPPED | OUTPATIENT
Start: 2023-06-21

## 2023-06-21 RX ORDER — FLUTICASONE PROPIONATE 50 MCG
2 SPRAY, SUSPENSION (ML) NASAL DAILY
Qty: 16 G | Refills: 5 | Status: SHIPPED | OUTPATIENT
Start: 2023-06-21

## 2023-06-21 RX ORDER — CITALOPRAM 20 MG/1
20 TABLET ORAL DAILY
Qty: 90 TABLET | Refills: 1 | Status: SHIPPED | OUTPATIENT
Start: 2023-06-21

## 2023-06-21 RX ORDER — PANTOPRAZOLE SODIUM 40 MG/1
40 TABLET, DELAYED RELEASE ORAL DAILY
Qty: 90 TABLET | Refills: 1 | Status: SHIPPED | OUTPATIENT
Start: 2023-06-21

## 2023-06-21 RX ORDER — ALBUTEROL SULFATE 90 UG/1
2 AEROSOL, METERED RESPIRATORY (INHALATION) EVERY 4 HOURS PRN
Qty: 18 G | Refills: 5 | Status: SHIPPED | OUTPATIENT
Start: 2023-06-21

## 2023-06-21 RX ORDER — CETIRIZINE HYDROCHLORIDE 10 MG/1
10 TABLET ORAL DAILY
Qty: 90 TABLET | Refills: 1 | Status: SHIPPED | OUTPATIENT
Start: 2023-06-21

## 2023-06-26 DIAGNOSIS — E04.2 MULTINODULAR THYROID: ICD-10-CM

## 2023-06-26 DIAGNOSIS — M13.0 POLYARTHRITIS: ICD-10-CM

## 2023-06-26 DIAGNOSIS — N18.32 STAGE 3B CHRONIC KIDNEY DISEASE (CKD) (HCC): ICD-10-CM

## 2023-06-26 DIAGNOSIS — H20.9 UVEITIS: ICD-10-CM

## 2023-06-26 LAB
ALBUMIN SERPL-MCNC: 4.4 G/DL (ref 3.5–5.2)
ALP SERPL-CCNC: 62 U/L (ref 35–104)
ALT SERPL-CCNC: 47 U/L (ref 0–32)
ANION GAP SERPL CALCULATED.3IONS-SCNC: 15 MMOL/L (ref 7–16)
AST SERPL-CCNC: 51 U/L (ref 0–31)
BILIRUB SERPL-MCNC: 0.3 MG/DL (ref 0–1.2)
BUN SERPL-MCNC: 17 MG/DL (ref 6–23)
CALCIUM SERPL-MCNC: 9.5 MG/DL (ref 8.6–10.2)
CHLORIDE SERPL-SCNC: 106 MMOL/L (ref 98–107)
CO2 SERPL-SCNC: 22 MMOL/L (ref 22–29)
CREAT SERPL-MCNC: 1.2 MG/DL (ref 0.5–1)
CRP SERPL HS-MCNC: 0.5 MG/DL (ref 0–0.4)
ERYTHROCYTE [SEDIMENTATION RATE] IN BLOOD BY WESTERGREN METHOD: 6 MM/HR (ref 0–20)
GLUCOSE SERPL-MCNC: 91 MG/DL (ref 74–99)
POTASSIUM SERPL-SCNC: 5 MMOL/L (ref 3.5–5)
PROT SERPL-MCNC: 6.8 G/DL (ref 6.4–8.3)
RHEUMATOID FACT SER NEPH-ACNC: <10 IU/ML (ref 0–13)
SODIUM SERPL-SCNC: 143 MMOL/L (ref 132–146)
T4 FREE SERPL-MCNC: 1.01 NG/DL (ref 0.93–1.7)
TSH SERPL-MCNC: 1.35 UIU/ML (ref 0.27–4.2)

## 2023-06-27 LAB — ANA SER QL: NEGATIVE

## 2023-06-30 LAB — CCP AB SER IA-ACNC: 1 U/ML (ref 0–7)

## 2023-07-12 DIAGNOSIS — R91.8 PULMONARY NODULES: ICD-10-CM

## 2023-07-18 ASSESSMENT — ENCOUNTER SYMPTOMS: SHORTNESS OF BREATH: 0

## 2023-07-18 NOTE — ASSESSMENT & PLAN NOTE
S/p steroid injections  Recent injections with minimal relief, also right unsuccessful last attempt  Ready to see ortho, referral provided  Consider gel injections  Continue topical diclofenac as needed

## 2023-07-18 NOTE — ASSESSMENT & PLAN NOTE
CT chest 2021 with 9 mm spiculated nodule RUL, previously 12 mm  Also several 6-7 mm nodules in RLL/LLL  Was told might have been due to mold exposure  Recheck CT chest

## 2023-11-20 ENCOUNTER — OFFICE VISIT (OUTPATIENT)
Dept: FAMILY MEDICINE CLINIC | Age: 88
End: 2023-11-20
Payer: MEDICARE

## 2023-11-20 VITALS
DIASTOLIC BLOOD PRESSURE: 82 MMHG | HEART RATE: 89 BPM | BODY MASS INDEX: 29.98 KG/M2 | TEMPERATURE: 97.5 F | OXYGEN SATURATION: 94 % | SYSTOLIC BLOOD PRESSURE: 132 MMHG | RESPIRATION RATE: 18 BRPM | WEIGHT: 191 LBS | HEIGHT: 67 IN

## 2023-11-20 DIAGNOSIS — J40 SINOBRONCHITIS: Primary | ICD-10-CM

## 2023-11-20 DIAGNOSIS — J32.9 SINOBRONCHITIS: Primary | ICD-10-CM

## 2023-11-20 DIAGNOSIS — U09.9 POST-COVID-19 CONDITION: ICD-10-CM

## 2023-11-20 DIAGNOSIS — J45.21 MILD INTERMITTENT ASTHMA WITH EXACERBATION: ICD-10-CM

## 2023-11-20 PROCEDURE — 1123F ACP DISCUSS/DSCN MKR DOCD: CPT | Performed by: PHYSICIAN ASSISTANT

## 2023-11-20 PROCEDURE — 99214 OFFICE O/P EST MOD 30 MIN: CPT | Performed by: PHYSICIAN ASSISTANT

## 2023-11-20 RX ORDER — PREDNISONE 20 MG/1
20 TABLET ORAL 2 TIMES DAILY
Qty: 10 TABLET | Refills: 0 | Status: SHIPPED | OUTPATIENT
Start: 2023-11-20 | End: 2023-11-25

## 2023-11-20 RX ORDER — DOXYCYCLINE HYCLATE 100 MG
100 TABLET ORAL 2 TIMES DAILY
Qty: 20 TABLET | Refills: 0 | Status: SHIPPED | OUTPATIENT
Start: 2023-11-20 | End: 2023-11-30

## 2023-11-20 NOTE — PROGRESS NOTES
Chief Complaint       Cough (Tested positive for covid 2 weeks ago, productive, hx of asthma albuterol prn) and Otalgia (L ear)      History of Present Illness   Source of history provided by:  patientJudith Pineda is a 81 y.o. old female presenting to the walk in clinic for reevaluation of persistent productive cough, chest congestion, coughing fits, nasal congestion, sinus pressure, and left ear pressure which has been present for the past 2 weeks. Patient tested positive for COVID-19 at the start of her symptoms. She is concerned because her illness does not seem to be resolving. Denies any recent fever, chills, wheezing, CP, SOB, or GI symptoms. Patient does have a history of asthma which is typically controlled with as needed albuterol. Patient does report having to use her rescue inhaler more often during this acute illness. Denies any contact with any individuals with known COVID-19 infection or under investigation for COVID-19 infection. Patient has been vaccinated for COVID-19. ROS    Unless otherwise stated in this report or unable to obtain because of the patient's clinical or mental status as evidenced by the medical record, this patients's positive and negative responses for Review of Systems, constitutional, psych, eyes, ENT, cardiovascular, respiratory, gastrointestinal, neurological, genitourinary, musculoskeletal, integument systems and systems related to the presenting problem are either stated in the preceding or were not pertinent or were negative for the symptoms and/or complaints related to the medical problem. Past Medical History:  has a past medical history of Anxiety, Asthma, Atrial fibrillation ([address]), Diverticulosis, Sessile colonic polyp, Syncope, and Ventricular arrhythmia. Past Surgical History:  has a past surgical history that includes Cataract removal (Bilateral); Cholecystectomy (2000); Foot surgery (Right, 2010);  Elbow surgery (Right, 2011); and Rotator cuff

## 2023-12-01 ENCOUNTER — OFFICE VISIT (OUTPATIENT)
Dept: PRIMARY CARE CLINIC | Age: 88
End: 2023-12-01

## 2023-12-01 ENCOUNTER — OFFICE VISIT (OUTPATIENT)
Dept: PRIMARY CARE CLINIC | Age: 88
End: 2023-12-01
Payer: MEDICARE

## 2023-12-01 VITALS
BODY MASS INDEX: 30.29 KG/M2 | DIASTOLIC BLOOD PRESSURE: 80 MMHG | HEART RATE: 79 BPM | SYSTOLIC BLOOD PRESSURE: 126 MMHG | WEIGHT: 193 LBS | HEIGHT: 67 IN | RESPIRATION RATE: 20 BRPM | OXYGEN SATURATION: 94 % | TEMPERATURE: 97.6 F

## 2023-12-01 VITALS
BODY MASS INDEX: 30.42 KG/M2 | DIASTOLIC BLOOD PRESSURE: 80 MMHG | OXYGEN SATURATION: 94 % | HEART RATE: 79 BPM | HEIGHT: 67 IN | RESPIRATION RATE: 20 BRPM | SYSTOLIC BLOOD PRESSURE: 126 MMHG | WEIGHT: 193.8 LBS | TEMPERATURE: 97.6 F

## 2023-12-01 DIAGNOSIS — H69.83 EUSTACHIAN TUBE DYSFUNCTION, BILATERAL: Primary | ICD-10-CM

## 2023-12-01 DIAGNOSIS — I35.0 MODERATE AORTIC VALVE STENOSIS: ICD-10-CM

## 2023-12-01 DIAGNOSIS — Z71.89 COUNSELING REGARDING ADVANCED DIRECTIVES AND GOALS OF CARE: ICD-10-CM

## 2023-12-01 DIAGNOSIS — E78.2 MIXED HYPERLIPIDEMIA: ICD-10-CM

## 2023-12-01 DIAGNOSIS — J45.909 ASTHMA DUE TO ENVIRONMENTAL ALLERGIES: ICD-10-CM

## 2023-12-01 DIAGNOSIS — I48.0 PAROXYSMAL ATRIAL FIBRILLATION (HCC): ICD-10-CM

## 2023-12-01 DIAGNOSIS — N18.32 STAGE 3B CHRONIC KIDNEY DISEASE (CKD) (HCC): ICD-10-CM

## 2023-12-01 DIAGNOSIS — J43.8 OTHER EMPHYSEMA (HCC): ICD-10-CM

## 2023-12-01 DIAGNOSIS — L60.0 INGROWN NAIL OF GREAT TOE: ICD-10-CM

## 2023-12-01 DIAGNOSIS — Z00.00 MEDICARE ANNUAL WELLNESS VISIT, SUBSEQUENT: Primary | ICD-10-CM

## 2023-12-01 DIAGNOSIS — M17.0 BILATERAL PRIMARY OSTEOARTHRITIS OF KNEE: ICD-10-CM

## 2023-12-01 LAB
ALBUMIN SERPL-MCNC: 3.8 G/DL (ref 3.5–5.2)
ALP BLD-CCNC: 82 U/L (ref 35–104)
ALT SERPL-CCNC: 32 U/L (ref 0–32)
ANION GAP SERPL CALCULATED.3IONS-SCNC: 16 MMOL/L (ref 7–16)
AST SERPL-CCNC: 30 U/L (ref 0–31)
BILIRUB SERPL-MCNC: 0.3 MG/DL (ref 0–1.2)
BUN BLDV-MCNC: 21 MG/DL (ref 6–23)
CALCIUM SERPL-MCNC: 9.5 MG/DL (ref 8.6–10.2)
CHLORIDE BLD-SCNC: 105 MMOL/L (ref 98–107)
CHOLESTEROL: 231 MG/DL
CO2: 21 MMOL/L (ref 22–29)
CREAT SERPL-MCNC: 1 MG/DL (ref 0.5–1)
CREATININE URINE: 168.6 MG/DL (ref 29–226)
GFR SERPL CREATININE-BSD FRML MDRD: 52 ML/MIN/1.73M2
GLUCOSE BLD-MCNC: 107 MG/DL (ref 74–99)
HCT VFR BLD CALC: 41.6 % (ref 34–48)
HDLC SERPL-MCNC: 39 MG/DL
HEMOGLOBIN: 12.9 G/DL (ref 11.5–15.5)
LDL CHOLESTEROL: 130 MG/DL
MCH RBC QN AUTO: 29.1 PG (ref 26–35)
MCHC RBC AUTO-ENTMCNC: 31 G/DL (ref 32–34.5)
MCV RBC AUTO: 93.7 FL (ref 80–99.9)
MICROALBUMIN/CREAT 24H UR: <12 MG/L (ref 0–19)
MICROALBUMIN/CREAT UR-RTO: NORMAL MCG/MG CREAT (ref 0–30)
PDW BLD-RTO: 13.1 % (ref 11.5–15)
PLATELET # BLD: 207 K/UL (ref 130–450)
PMV BLD AUTO: 11.6 FL (ref 7–12)
POTASSIUM SERPL-SCNC: 4.5 MMOL/L (ref 3.5–5)
RBC # BLD: 4.44 M/UL (ref 3.5–5.5)
SODIUM BLD-SCNC: 142 MMOL/L (ref 132–146)
TOTAL PROTEIN: 6.8 G/DL (ref 6.4–8.3)
TRIGL SERPL-MCNC: 309 MG/DL
VLDLC SERPL CALC-MCNC: 62 MG/DL
WBC # BLD: 8.7 K/UL (ref 4.5–11.5)

## 2023-12-01 PROCEDURE — 1123F ACP DISCUSS/DSCN MKR DOCD: CPT | Performed by: STUDENT IN AN ORGANIZED HEALTH CARE EDUCATION/TRAINING PROGRAM

## 2023-12-01 PROCEDURE — G0439 PPPS, SUBSEQ VISIT: HCPCS | Performed by: STUDENT IN AN ORGANIZED HEALTH CARE EDUCATION/TRAINING PROGRAM

## 2023-12-01 ASSESSMENT — LIFESTYLE VARIABLES
HOW MANY STANDARD DRINKS CONTAINING ALCOHOL DO YOU HAVE ON A TYPICAL DAY: PATIENT DOES NOT DRINK
HOW OFTEN DO YOU HAVE A DRINK CONTAINING ALCOHOL: NEVER

## 2023-12-01 ASSESSMENT — PATIENT HEALTH QUESTIONNAIRE - PHQ9
SUM OF ALL RESPONSES TO PHQ9 QUESTIONS 1 & 2: 0
SUM OF ALL RESPONSES TO PHQ QUESTIONS 1-9: 0
SUM OF ALL RESPONSES TO PHQ QUESTIONS 1-9: 0
2. FEELING DOWN, DEPRESSED OR HOPELESS: 0
SUM OF ALL RESPONSES TO PHQ QUESTIONS 1-9: 0
1. LITTLE INTEREST OR PLEASURE IN DOING THINGS: 0
SUM OF ALL RESPONSES TO PHQ QUESTIONS 1-9: 0

## 2023-12-01 ASSESSMENT — ENCOUNTER SYMPTOMS
CONSTIPATION: 1
DIARRHEA: 0
COUGH: 0
SHORTNESS OF BREATH: 1
SORE THROAT: 0

## 2023-12-01 NOTE — ASSESSMENT & PLAN NOTE
Mild-moderate aortic stenosis on most recent echo  Referral to cardiology given patient considering TKA

## 2023-12-01 NOTE — PROGRESS NOTES
Referral To Cardiology     Referral Priority:   Routine     Referral Type:   Eval and Treat     Referral Reason:   Specialty Services Required     Requested Specialty:   Cardiology     Number of Visits Requested:   [address], [place], [id number] [address], [address], [facility]     Referral Priority:   Routine     Referral Type:   Eval and Treat     Referral Reason:   Specialty Services Required     Referred to Provider:   Kelli Grossman DPM     Requested Specialty:   Podiatry     Number of Visits Requested:   1      No results found for this or any previous visit (from the past 24 hour(s)).

## 2023-12-01 NOTE — ASSESSMENT & PLAN NOTE
Chronic, stable  Recheck  Avoid nephrotoxic medications  Maintain hydration Petrona Pimentel MD  [facility], [address], [address]  [phone]  [email or web address]      Gastroenterology Outpatient History and Physical    Patient: Marta Brunson    Physician: yL Handy MD    Vital Signs: Blood pressure (!) 176/74, pulse (!) 52, resp. rate 12, height 5' 7\" (1.702 m), weight 83.9 kg (185 lb), SpO2 99 %, not currently breastfeeding. Allergies: Allergies   Allergen Reactions    Pcn [Penicillins] Hives     Has had rocephin and ancef without reaction. Chief Complaint: abd pain    History of Present Illness: here for EGD to further evaluate some epigastric pain, dysphagia and acid reflux. See OV note for more details.     History:  Past Medical History:   Diagnosis Date    Acid indigestion     Anxiety     Gastrointestinal disorder     acid reflux    Headache     Hypertension     Other ill-defined conditions(799.89)     fibromyalgia      Past Surgical History:   Procedure Laterality Date    GERD TST W/ MUCOS IMPEDE ELECTROD,>1HR  2016         HX GYN      , tubal ligation    WI ERCP REMOVE CALCULI/DEBRIS BILIARY/PANCREAS DUCT  2017         [place] ERCP W/SPHINCTEROTOMY/PAPILLOTOMY  2017         WI ESOPHAGEAL MOTILITY STUDY W/INTERP&RPT  2016         UPPER GI ENDOSCOPY,BIOPSY  2017           Social History     Socioeconomic History    Marital status:    Tobacco Use    Smoking status: Never    Smokeless tobacco: Never   Substance and Sexual Activity    Alcohol use: No      Family History   Problem Relation Age of Onset    Cancer Mother 52        breast, colon    Heart Disease Mother     Hypertension Mother     Diabetes Mother     Cancer Father         spinal    Hypertension Father     Hypertension Brother     Diabetes Maternal Aunt       Patient Active Problem List   Diagnosis Code    UTI (urinary tract infection) N39.0    Acid indigestion K30 Medications:   Prior to Admission medications    Medication Sig Start Date End Date Taking? Authorizing Provider   dexlansoprazole (DEXILANT) 60 mg CpDB capsule (delayed release) Take  by mouth. Yes Provider, Historical   irbesartan (AVAPRO) 300 mg tablet Take 300 mg by mouth daily. Yes Provider, Historical   gabapentin (NEURONTIN) 100 mg capsule Take 100 mg by mouth three (3) times daily. Yes Provider, Historical   sucralfate (CARAFATE) 100 mg/mL suspension Take 10 mL by mouth three (3) times daily. Patient not taking: Reported on 8/5/2022 2/2/17   Judy Mcdermott MD   oxyCODONE-acetaminophen (PERCOCET) 5-325 mg per tablet Take 1-2 Tabs by mouth every four (4) hours as needed for Pain. Max Daily Amount: 12 Tabs. 1/20/17   Judy Mcdermott MD   ibuprofen (MOTRIN) 600 mg tablet Take 1 Tab by mouth every six (6) hours as needed for Pain. 1/20/17   Judy Mcdermott MD   lansoprazole (PREVACID) 30 mg capsule Take 20 mg by mouth Daily (before breakfast). Patient not taking: Reported on 8/5/2022    Other, MD Yanique       Physical Exam:     General: well developed, well nourished   HEENT: unremarkable   Heart: regular rhythm no mumur    Lungs: clear   Abdominal:  benign   Neurological: unremarkable   Extremities: no edema     Findings/Diagnosis: epigastric pain, dysphagia.  GERD    Plan of Care/Planned Procedure: EGD with  monitored anesthesia care sedation       Signed:  Griselda Hopper MD 8/5/2022

## 2023-12-01 NOTE — PATIENT INSTRUCTIONS
RSV vaccine    If Flonase doesn't help, try Coricidin HBP    [facility]  Address: [address], [place], [place]  Phone: [phone]

## 2023-12-01 NOTE — PROGRESS NOTES
TABS tablet Take 1 tablet by mouth daily Yes Janet Johnson MD   pantoprazole (PROTONIX) 40 MG tablet Take 1 tablet by mouth daily Yes Janet Johnson MD   fenofibrate micronized (ANTARA) 43 MG capsule Take 1 capsule by mouth every morning (before breakfast)  Patient not taking: Reported on 12/1/2023 Yes Janet Johnson MD   citalopram (CELEXA) 20 MG tablet Take 1 tablet by mouth daily Yes Janet Johnson MD   cetirizine (ZYRTEC) 10 MG tablet Take 1 tablet by mouth daily Yes Janet Johnson MD   fluticasone (FLONASE) 50 MCG/ACT nasal spray 2 sprays by Nasal route daily Yes Janet Johnson MD   albuterol sulfate HFA (VENTOLIN HFA) 108 (90 Base) MCG/ACT inhaler Inhale 2 puffs into the lungs every 4 hours as needed for Wheezing or Shortness of Breath (coughing) Yes Janet Johnson MD   dorzolamide (TRUSOPT) 2 % ophthalmic solution INSTILL 1 DROPS TWICE DAILY INTO LEFT EYE Yes Yung Garcia MD   bisacodyl (DULCOLAX) 5 MG EC tablet Take 1 tablet by mouth daily as needed Yes ProviderYung MD   Glycerin, Laxative, (GLYCERIN ADULT) 2 g SUPP Place rectally Yes Yung Garcia MD   Rubber Goods (ENEMA BOTTLE) MISC by Does not apply route Yes Yung Garcia MD   ALPRAZolam (XANAX) 0.25 MG tablet Take 1 tablet by mouth daily as needed. Yes ProviderYung MD   Bacillus Coagulans-Inulin (PROBIOTIC) 1-250 BILLION-MG CAPS Take 1 capsule by mouth daily Yes ProviderYung MD   diclofenac sodium (VOLTAREN) 1 % GEL Apply 4 g topically 4 times daily as needed for Pain  Patient not taking: Reported on 11/20/2023  Janet Johnson MD   clotrimazole (LOTRIMIN) 1 % vaginal cream Apply topically for 1-2 weeks.   Patient not taking: Reported on 6/21/2023  MD Maddie Rodney (Including outside providers/suppliers regularly involved in providing care):   Patient Care Team:  Janet Johnson MD as PCP - General (Family Medicine)  Janet Johnson MD as PCP -

## 2024-01-18 DIAGNOSIS — I48.0 PAROXYSMAL ATRIAL FIBRILLATION (HCC): ICD-10-CM

## 2024-01-18 DIAGNOSIS — J30.2 SEASONAL ALLERGIC RHINITIS, UNSPECIFIED TRIGGER: ICD-10-CM

## 2024-01-18 DIAGNOSIS — F41.1 GENERALIZED ANXIETY DISORDER: ICD-10-CM

## 2024-01-18 RX ORDER — CETIRIZINE HYDROCHLORIDE 10 MG/1
10 TABLET ORAL DAILY
Qty: 90 TABLET | Refills: 1 | Status: SHIPPED | OUTPATIENT
Start: 2024-01-18

## 2024-01-18 RX ORDER — CITALOPRAM 20 MG/1
20 TABLET ORAL DAILY
Qty: 90 TABLET | Refills: 1 | Status: SHIPPED | OUTPATIENT
Start: 2024-01-18

## 2024-01-18 RX ORDER — RIVAROXABAN 15 MG/1
15 TABLET, FILM COATED ORAL DAILY
Qty: 30 TABLET | Refills: 5 | Status: SHIPPED | OUTPATIENT
Start: 2024-01-18

## 2024-01-18 RX ORDER — PANTOPRAZOLE SODIUM 40 MG/1
40 TABLET, DELAYED RELEASE ORAL DAILY
Qty: 90 TABLET | Refills: 1 | Status: SHIPPED | OUTPATIENT
Start: 2024-01-18

## 2024-01-18 NOTE — TELEPHONE ENCOUNTER
Last Appointment:  12/1/2023  Future Appointments   Date Time Provider Department Center   5/31/2024 10:00 AM Noah Luna MD [facility]   12/6/2024 10:30 AM Noah Luna MD [facility]   12/6/2024 11:00 AM Noah Luna MD [facility]

## 2024-01-18 NOTE — TELEPHONE ENCOUNTER
Last Appointment:  12/1/2023  Future Appointments   Date Time Provider Department Center   5/31/2024 10:00 AM Noah Luna MD [facility]   12/6/2024 10:30 AM Noah Luna MD [facility]   12/6/2024 11:00 AM Noah Luna MD [facility]

## 2024-01-29 ENCOUNTER — OFFICE VISIT (OUTPATIENT)
Dept: FAMILY MEDICINE CLINIC | Age: 89
End: 2024-01-29
Payer: MEDICARE

## 2024-01-29 VITALS
TEMPERATURE: 97.2 F | BODY MASS INDEX: 30.23 KG/M2 | OXYGEN SATURATION: 94 % | SYSTOLIC BLOOD PRESSURE: 128 MMHG | RESPIRATION RATE: 20 BRPM | WEIGHT: 193 LBS | HEART RATE: 65 BPM | DIASTOLIC BLOOD PRESSURE: 70 MMHG

## 2024-01-29 DIAGNOSIS — I35.0 MODERATE AORTIC VALVE STENOSIS: ICD-10-CM

## 2024-01-29 DIAGNOSIS — D68.69 SECONDARY HYPERCOAGULABLE STATE (HCC): ICD-10-CM

## 2024-01-29 DIAGNOSIS — R06.02 SHORTNESS OF BREATH: ICD-10-CM

## 2024-01-29 DIAGNOSIS — I48.0 PAROXYSMAL ATRIAL FIBRILLATION (HCC): Primary | ICD-10-CM

## 2024-01-29 DIAGNOSIS — I48.0 PAROXYSMAL ATRIAL FIBRILLATION (HCC): ICD-10-CM

## 2024-01-29 PROCEDURE — 1123F ACP DISCUSS/DSCN MKR DOCD: CPT | Performed by: NURSE PRACTITIONER

## 2024-01-29 PROCEDURE — 93000 ELECTROCARDIOGRAM COMPLETE: CPT | Performed by: NURSE PRACTITIONER

## 2024-01-29 PROCEDURE — 99214 OFFICE O/P EST MOD 30 MIN: CPT | Performed by: NURSE PRACTITIONER

## 2024-01-29 NOTE — PROGRESS NOTES
Chief Complaint   Shortness of Breath (Started a few days ago) and Dizziness      HPI   Source of history provided by: patient.      Joann Armas is a 88 y.o. old female who presents to walk-in care for evaluation of dizziness X few days. Associated symptoms include shortness of breath and dizziness .  Since onset symptoms have been worsening.  She has a known history of proximal atrial fibrillation which she is anticoagulated on with Xarelto.  She also has known aortic valve stenosis.  She is scheduled for an establishing appointment with cardiology in 3 weeks.  She reports that over the past few days she has had significantly worse dyspnea with exertion.  She denies any chest pain.  She denies any syncopal episodes.  She does report with dyspnea she has had intermittent heart palpitations that resolved with rest.    ROS   Pertinent positives and negatives are stated within HPI, all other systems reviewed and are negative.  Past Medical History:  has a past medical history of Anxiety, Asthma, Atrial fibrillation (HCC), Diverticulosis, Sessile colonic polyp, Syncope, and Ventricular arrhythmia.  Surgical History:  has a past surgical history that includes Cataract removal (Bilateral); Cholecystectomy (2000); Foot surgery (Right, 2010); Elbow surgery (Right, 2011); Rotator cuff repair (Right, 2014); and Skin cancer excision (2023).  Social History:  reports that she has never smoked. She has never used smokeless tobacco. She reports current alcohol use of about 7.0 standard drinks of alcohol per week. She reports that she does not use drugs.  Family History: family history includes Emphysema in her sister; Lung Cancer in her brother; No Known Problems in her brother, son, son, and son; Other in her maternal grandfather; Stroke in her maternal grandmother, mother, and paternal grandmother.  Allergies: Gluten, Gluten meal, and Statins    Physical Exam      VS:  /70   Pulse 65   Temp 97.2 °F (36.2 °C)   Resp

## 2024-03-11 ENCOUNTER — TELEPHONE (OUTPATIENT)
Dept: PRIMARY CARE CLINIC | Age: 89
End: 2024-03-11

## 2024-03-11 NOTE — TELEPHONE ENCOUNTER
Please see previous message from [facility], please advise where we can schedule patient for pre-op. Eye surgery on 3/19/24.

## 2024-03-11 NOTE — TELEPHONE ENCOUNTER
----- Message from Elisa Link sent at 3/11/2024  2:58 PM EDT -----  Subject: Appointment Request    Reason for Call: Established Patient Appointment needed: Routine Pre-Op    QUESTIONS    Reason for appointment request? No appointments available during search     Additional Information for Provider? PT needs Pre Op appointment for   Surgery [facility] on 03/19/2024, Eye surgery   ---------------------------------------------------------------------------  --------------  CALL BACK INFO  [phone]; OK to leave message on voicemail  ---------------------------------------------------------------------------  --------------  SCRIPT ANSWERS

## 2024-03-11 NOTE — TELEPHONE ENCOUNTER
Can be double booked Tuesday or Thursday if she is willing to come to [place]. Or 745am Friday [place] if willing to come that early. Otherwise patient will need to be scheduled with alternate [place] provider if they have any openings. I do not have any other [place] openings this week before her surgery.

## 2024-03-12 ENCOUNTER — OFFICE VISIT (OUTPATIENT)
Dept: PRIMARY CARE CLINIC | Age: 89
End: 2024-03-12
Payer: MEDICARE

## 2024-03-12 VITALS
BODY MASS INDEX: 29.76 KG/M2 | DIASTOLIC BLOOD PRESSURE: 70 MMHG | TEMPERATURE: 98.7 F | RESPIRATION RATE: 16 BRPM | HEART RATE: 72 BPM | OXYGEN SATURATION: 94 % | HEIGHT: 67 IN | SYSTOLIC BLOOD PRESSURE: 110 MMHG | WEIGHT: 189.6 LBS

## 2024-03-12 DIAGNOSIS — I48.0 PAROXYSMAL ATRIAL FIBRILLATION (HCC): ICD-10-CM

## 2024-03-12 DIAGNOSIS — H40.1122 PRIMARY OPEN ANGLE GLAUCOMA (POAG) OF LEFT EYE, MODERATE STAGE: ICD-10-CM

## 2024-03-12 DIAGNOSIS — J43.8 OTHER EMPHYSEMA (HCC): ICD-10-CM

## 2024-03-12 DIAGNOSIS — N18.32 STAGE 3B CHRONIC KIDNEY DISEASE (CKD) (HCC): ICD-10-CM

## 2024-03-12 DIAGNOSIS — H40.1113 PRIMARY OPEN ANGLE GLAUCOMA (POAG) OF RIGHT EYE, SEVERE STAGE: ICD-10-CM

## 2024-03-12 DIAGNOSIS — Z01.818 PRE-OPERATIVE GENERAL PHYSICAL EXAMINATION: Primary | ICD-10-CM

## 2024-03-12 DIAGNOSIS — M17.0 BILATERAL PRIMARY OSTEOARTHRITIS OF KNEE: ICD-10-CM

## 2024-03-12 PROCEDURE — 1123F ACP DISCUSS/DSCN MKR DOCD: CPT | Performed by: STUDENT IN AN ORGANIZED HEALTH CARE EDUCATION/TRAINING PROGRAM

## 2024-03-12 PROCEDURE — 99213 OFFICE O/P EST LOW 20 MIN: CPT | Performed by: STUDENT IN AN ORGANIZED HEALTH CARE EDUCATION/TRAINING PROGRAM

## 2024-03-12 RX ORDER — ACETAZOLAMIDE 250 MG/1
250 TABLET ORAL 2 TIMES DAILY
COMMUNITY

## 2024-03-12 RX ORDER — TIMOLOL MALEATE 5 MG/ML
SOLUTION/ DROPS OPHTHALMIC
COMMUNITY
Start: 2024-01-26

## 2024-03-12 SDOH — ECONOMIC STABILITY: INCOME INSECURITY: HOW HARD IS IT FOR YOU TO PAY FOR THE VERY BASICS LIKE FOOD, HOUSING, MEDICAL CARE, AND HEATING?: NOT HARD AT ALL

## 2024-03-12 SDOH — ECONOMIC STABILITY: FOOD INSECURITY: WITHIN THE PAST 12 MONTHS, THE FOOD YOU BOUGHT JUST DIDN'T LAST AND YOU DIDN'T HAVE MONEY TO GET MORE.: NEVER TRUE

## 2024-03-12 SDOH — ECONOMIC STABILITY: FOOD INSECURITY: WITHIN THE PAST 12 MONTHS, YOU WORRIED THAT YOUR FOOD WOULD RUN OUT BEFORE YOU GOT MONEY TO BUY MORE.: NEVER TRUE

## 2024-03-12 ASSESSMENT — PATIENT HEALTH QUESTIONNAIRE - PHQ9
1. LITTLE INTEREST OR PLEASURE IN DOING THINGS: 0
SUM OF ALL RESPONSES TO PHQ QUESTIONS 1-9: 0
2. FEELING DOWN, DEPRESSED OR HOPELESS: 0
SUM OF ALL RESPONSES TO PHQ9 QUESTIONS 1 & 2: 0

## 2024-03-12 NOTE — PROGRESS NOTES
PRE-OPERATIVE ASSESSMENT    3/12/24  Name: Joann Armas   : 1935   Age: 88 y.o.  Sex: female        Assessment & Plan:     Joann Armas is a 88 y.o. female scheduled for right eye AQUEOUS SHUNT TO EXTRAOCULAR EQUATORIAL PLATE RESERVOIR EXTERNAL APPROACH W/GRAFT with Dr. Morgan Lu 3/19/2024.      ICD-10-CM    1. Pre-operative general physical examination  Z01.818       2. Primary open angle glaucoma (POAG) of right eye, severe stage  H40.1113       3. Primary open angle glaucoma (POAG) of left eye, moderate stage  H40.1122       4. Paroxysmal atrial fibrillation (HCC)  I48.0       5. Stage 3b chronic kidney disease (CKD) (HCC)  N18.32       6. Other emphysema (HCC)  J43.8       7. Bilateral primary osteoarthritis of knee  M17.0 Kee Bradley DO, Orthopaedics, [place]        Patient is medically optimized for planned surgery. Chronic conditions as above are controlled/at goal with exception of her OA. Stress test 2024 negative for infarct or ischemia, normal wall motion. normal left ventricular function with EF 71%. Labs 2023 notable for hyperlipidemia and stable CKD3B (actually improved with GFR>45).    Referral to new ortho for severe OA. Continue topical diclofenac as needed.    Counseled patient regarding above diagnosis, including possible risks and complications, especially if left uncontrolled.  Counseled patient as appropriate and relevant regarding any possible side effects, risks, and alternatives to treatment; the patient verbalizes understanding, and is in agreement with the plan as detailed above.   All educational materials and instructions were discussed and included on the After Visit Summary.  All questions answered to the patient's satisfaction.  The patient was advised to call or send [employer] message for any concerns prior to next appointment.    Return in about 3 months (around 2024).    Subjective:     Chief Complaint   Patient presents with    Pre-op

## 2024-03-12 NOTE — PROGRESS NOTES
Spoke with Dr Lu's office and they states they do not have a form that needs filled out by you. You just put in the office note if patient is cleared or not and we fax the office note

## 2024-03-13 ASSESSMENT — ENCOUNTER SYMPTOMS
COUGH: 0
DIARRHEA: 0
SHORTNESS OF BREATH: 0
CONSTIPATION: 1

## 2024-04-09 SDOH — HEALTH STABILITY: PHYSICAL HEALTH: ON AVERAGE, HOW MANY DAYS PER WEEK DO YOU ENGAGE IN MODERATE TO STRENUOUS EXERCISE (LIKE A BRISK WALK)?: 0 DAYS

## 2024-04-12 ENCOUNTER — OFFICE VISIT (OUTPATIENT)
Dept: ORTHOPEDIC SURGERY | Age: 89
End: 2024-04-12

## 2024-04-12 VITALS — WEIGHT: 193 LBS | HEIGHT: 67 IN | BODY MASS INDEX: 30.29 KG/M2

## 2024-04-12 DIAGNOSIS — M17.0 BILATERAL PRIMARY OSTEOARTHRITIS OF KNEE: Primary | ICD-10-CM

## 2024-04-12 RX ORDER — BUPIVACAINE HYDROCHLORIDE 2.5 MG/ML
1 INJECTION, SOLUTION INFILTRATION; PERINEURAL ONCE
Status: SHIPPED | OUTPATIENT
Start: 2024-04-12

## 2024-04-12 RX ORDER — LIDOCAINE HYDROCHLORIDE 10 MG/ML
1 INJECTION, SOLUTION INFILTRATION; PERINEURAL ONCE
Status: SHIPPED | OUTPATIENT
Start: 2024-04-12

## 2024-04-12 RX ORDER — CIPROFLOXACIN HYDROCHLORIDE 3.5 MG/ML
SOLUTION/ DROPS TOPICAL
COMMUNITY
Start: 2024-03-13

## 2024-04-12 RX ORDER — TRIAMCINOLONE ACETONIDE 40 MG/ML
40 INJECTION, SUSPENSION INTRA-ARTICULAR; INTRAMUSCULAR ONCE
Status: SHIPPED | OUTPATIENT
Start: 2024-04-12

## 2024-04-12 RX ORDER — PREDNISOLONE ACETATE 10 MG/ML
SUSPENSION/ DROPS OPHTHALMIC
COMMUNITY
Start: 2024-03-13

## 2024-04-12 RX ORDER — BRIMONIDINE TARTRATE 1 MG/ML
1 SOLUTION/ DROPS OPHTHALMIC 2 TIMES DAILY
COMMUNITY
Start: 2024-02-01

## 2024-04-12 NOTE — PROGRESS NOTES
of education: Not on file    Highest education level: Not on file   Occupational History    Not on file   Tobacco Use    Smoking status: Never    Smokeless tobacco: Never   Substance and Sexual Activity    Alcohol use: Yes     Alcohol/week: 7.0 standard drinks of alcohol     Types: 7 Shots of liquor per week     Comment: once daily    Drug use: Never    Sexual activity: Not Currently   Other Topics Concern    Not on file   Social History Narrative    Not on file     Social Determinants of Health     Financial Resource Strain: Low Risk  (3/12/2024)    Overall Financial Resource Strain (CARDIA)     Difficulty of Paying Living Expenses: Not hard at all   Food Insecurity: No Food Insecurity (3/12/2024)    Hunger Vital Sign     Worried About Running Out of Food in the Last Year: Never true     Ran Out of Food in the Last Year: Never true   Transportation Needs: Unknown (3/12/2024)    PRAPARE - Transportation     Lack of Transportation (Medical): Not on file     Lack of Transportation (Non-Medical): No   Physical Activity: Inactive (4/9/2024)    Exercise Vital Sign     Days of Exercise per Week: 0 days     Minutes of Exercise per Session: 0 min   Stress: Not on file   Social Connections: Not on file   Intimate Partner Violence: Not on file   Housing Stability: Unknown (3/12/2024)    Housing Stability Vital Sign     Unable to Pay for Housing in the Last Year: Not on file     Number of Places Lived in the Last Year: Not on file     Unstable Housing in the Last Year: No     Social History     Occupational History    Not on file   Tobacco Use    Smoking status: Never    Smokeless tobacco: Never   Substance and Sexual Activity    Alcohol use: Yes     Alcohol/week: 7.0 standard drinks of alcohol     Types: 7 Shots of liquor per week     Comment: once daily    Drug use: Never    Sexual activity: Not Currently       CURRENT MEDICATIONS     Current Outpatient Medications:     ALPHAGAN P 0.1 % SOLN, Apply 1 drop to eye 2 times

## 2024-05-22 NOTE — ASSESSMENT & PLAN NOTE
Patient returns for bilateral knee steroid injections today  See separate procedure note  Consider ortho referral upon return from [place] no

## 2024-05-31 ENCOUNTER — OFFICE VISIT (OUTPATIENT)
Dept: PRIMARY CARE CLINIC | Age: 89
End: 2024-05-31

## 2024-05-31 VITALS
SYSTOLIC BLOOD PRESSURE: 126 MMHG | WEIGHT: 195.4 LBS | BODY MASS INDEX: 30.67 KG/M2 | OXYGEN SATURATION: 95 % | HEIGHT: 67 IN | TEMPERATURE: 97.6 F | HEART RATE: 81 BPM | DIASTOLIC BLOOD PRESSURE: 78 MMHG | RESPIRATION RATE: 18 BRPM

## 2024-05-31 DIAGNOSIS — J30.2 SEASONAL ALLERGIC RHINITIS, UNSPECIFIED TRIGGER: ICD-10-CM

## 2024-05-31 DIAGNOSIS — J43.8 OTHER EMPHYSEMA (HCC): ICD-10-CM

## 2024-05-31 DIAGNOSIS — J45.909 ASTHMA DUE TO ENVIRONMENTAL ALLERGIES: ICD-10-CM

## 2024-05-31 DIAGNOSIS — I35.0 MODERATE AORTIC VALVE STENOSIS: ICD-10-CM

## 2024-05-31 DIAGNOSIS — H40.1113 PRIMARY OPEN ANGLE GLAUCOMA (POAG) OF RIGHT EYE, SEVERE STAGE: ICD-10-CM

## 2024-05-31 DIAGNOSIS — M17.0 BILATERAL PRIMARY OSTEOARTHRITIS OF KNEE: ICD-10-CM

## 2024-05-31 DIAGNOSIS — N18.32 STAGE 3B CHRONIC KIDNEY DISEASE (CKD) (HCC): ICD-10-CM

## 2024-05-31 DIAGNOSIS — R42 DIZZY: ICD-10-CM

## 2024-05-31 DIAGNOSIS — H40.1122 PRIMARY OPEN ANGLE GLAUCOMA (POAG) OF LEFT EYE, MODERATE STAGE: ICD-10-CM

## 2024-05-31 DIAGNOSIS — Z51.81 ENCOUNTER FOR MONITORING LONG-TERM PROTON PUMP INHIBITOR THERAPY: ICD-10-CM

## 2024-05-31 DIAGNOSIS — E04.2 MULTINODULAR THYROID: ICD-10-CM

## 2024-05-31 DIAGNOSIS — Z79.899 ENCOUNTER FOR MONITORING LONG-TERM PROTON PUMP INHIBITOR THERAPY: ICD-10-CM

## 2024-05-31 DIAGNOSIS — I48.0 PAROXYSMAL ATRIAL FIBRILLATION (HCC): ICD-10-CM

## 2024-05-31 DIAGNOSIS — N90.89 VULVAR LESION: ICD-10-CM

## 2024-05-31 DIAGNOSIS — M99.01 CERVICAL SOMATIC DYSFUNCTION: ICD-10-CM

## 2024-05-31 DIAGNOSIS — K21.9 GASTROESOPHAGEAL REFLUX DISEASE, UNSPECIFIED WHETHER ESOPHAGITIS PRESENT: ICD-10-CM

## 2024-05-31 DIAGNOSIS — F41.1 GENERALIZED ANXIETY DISORDER: ICD-10-CM

## 2024-05-31 DIAGNOSIS — R73.9 HYPERGLYCEMIA: ICD-10-CM

## 2024-05-31 DIAGNOSIS — R42 DIZZY: Primary | ICD-10-CM

## 2024-05-31 PROBLEM — R79.89 ELEVATED LIVER FUNCTION TESTS: Status: RESOLVED | Noted: 2022-11-29 | Resolved: 2024-05-31

## 2024-05-31 LAB
ALBUMIN: 4.3 G/DL (ref 3.5–5.2)
ALP BLD-CCNC: 74 U/L (ref 35–104)
ALT SERPL-CCNC: 30 U/L (ref 0–32)
ANION GAP SERPL CALCULATED.3IONS-SCNC: 15 MMOL/L (ref 7–16)
AST SERPL-CCNC: 31 U/L (ref 0–31)
BILIRUB SERPL-MCNC: 0.2 MG/DL (ref 0–1.2)
BUN BLDV-MCNC: 17 MG/DL (ref 6–23)
CALCIUM SERPL-MCNC: 9.5 MG/DL (ref 8.6–10.2)
CHLORIDE BLD-SCNC: 104 MMOL/L (ref 98–107)
CO2: 21 MMOL/L (ref 22–29)
CREAT SERPL-MCNC: 1 MG/DL (ref 0.5–1)
FOLATE: 13.4 NG/ML (ref 4.8–24.2)
GFR, ESTIMATED: 53 ML/MIN/1.73M2
GLUCOSE BLD-MCNC: 92 MG/DL (ref 74–99)
HBA1C MFR BLD: 6.3 % (ref 4–5.6)
HCT VFR BLD CALC: 44.8 % (ref 34–48)
HEMOGLOBIN: 13.5 G/DL (ref 11.5–15.5)
MAGNESIUM: 2.1 MG/DL (ref 1.6–2.6)
MCH RBC QN AUTO: 28.6 PG (ref 26–35)
MCHC RBC AUTO-ENTMCNC: 30.1 G/DL (ref 32–34.5)
MCV RBC AUTO: 94.9 FL (ref 80–99.9)
PDW BLD-RTO: 13.2 % (ref 11.5–15)
PLATELET # BLD: 209 K/UL (ref 130–450)
PMV BLD AUTO: 10.9 FL (ref 7–12)
POTASSIUM SERPL-SCNC: 5.2 MMOL/L (ref 3.5–5)
RBC # BLD: 4.72 M/UL (ref 3.5–5.5)
SODIUM BLD-SCNC: 140 MMOL/L (ref 132–146)
T4 FREE: 1 NG/DL (ref 0.9–1.7)
TOTAL PROTEIN: 6.8 G/DL (ref 6.4–8.3)
TSH SERPL DL<=0.05 MIU/L-ACNC: 1.97 UIU/ML (ref 0.27–4.2)
VITAMIN B-12: 292 PG/ML (ref 211–946)
WBC # BLD: 7 K/UL (ref 4.5–11.5)

## 2024-05-31 RX ORDER — PANTOPRAZOLE SODIUM 40 MG/1
40 TABLET, DELAYED RELEASE ORAL DAILY
Qty: 90 TABLET | Refills: 1 | Status: SHIPPED | OUTPATIENT
Start: 2024-05-31

## 2024-05-31 RX ORDER — CITALOPRAM 20 MG/1
20 TABLET ORAL DAILY
Qty: 90 TABLET | Refills: 1 | Status: SHIPPED | OUTPATIENT
Start: 2024-05-31

## 2024-05-31 RX ORDER — ALBUTEROL SULFATE 90 UG/1
2 AEROSOL, METERED RESPIRATORY (INHALATION) EVERY 4 HOURS PRN
Qty: 18 G | Refills: 5 | Status: SHIPPED | OUTPATIENT
Start: 2024-05-31

## 2024-05-31 RX ORDER — FLUTICASONE PROPIONATE 50 MCG
2 SPRAY, SUSPENSION (ML) NASAL DAILY
Qty: 3 EACH | Refills: 1 | Status: SHIPPED | OUTPATIENT
Start: 2024-05-31

## 2024-05-31 RX ORDER — BUDESONIDE AND FORMOTEROL FUMARATE DIHYDRATE 80; 4.5 UG/1; UG/1
2 AEROSOL RESPIRATORY (INHALATION) 2 TIMES DAILY
Qty: 3 EACH | Refills: 1 | Status: SHIPPED | OUTPATIENT
Start: 2024-05-31

## 2024-05-31 RX ORDER — CLOTRIMAZOLE 1 %
CREAM WITH APPLICATOR VAGINAL
Qty: 45 G | Refills: 0 | Status: SHIPPED | OUTPATIENT
Start: 2024-05-31

## 2024-05-31 RX ORDER — CETIRIZINE HYDROCHLORIDE 10 MG/1
10 TABLET ORAL DAILY
Qty: 90 TABLET | Refills: 1 | Status: CANCELLED | OUTPATIENT
Start: 2024-05-31

## 2024-05-31 RX ORDER — RIVAROXABAN 15 MG/1
15 TABLET, FILM COATED ORAL DAILY
Qty: 90 TABLET | Refills: 1 | Status: CANCELLED | OUTPATIENT
Start: 2024-05-31

## 2024-05-31 RX ORDER — NETARSUDIL AND LATANOPROST OPHTHALMIC SOLUTION, 0.02%/0.005% .2; .05 MG/ML; MG/ML
1 SOLUTION/ DROPS OPHTHALMIC; TOPICAL NIGHTLY
COMMUNITY

## 2024-05-31 RX ORDER — LEVOCETIRIZINE DIHYDROCHLORIDE 5 MG/1
5 TABLET, FILM COATED ORAL NIGHTLY
Qty: 90 TABLET | Refills: 1 | Status: SHIPPED | OUTPATIENT
Start: 2024-05-31

## 2024-05-31 ASSESSMENT — ENCOUNTER SYMPTOMS
WHEEZING: 1
COUGH: 0
BLOOD IN STOOL: 0
CONSTIPATION: 0
SHORTNESS OF BREATH: 1
DIARRHEA: 0

## 2024-05-31 NOTE — PATIENT INSTRUCTIONS
Dr. Estrada  [facility]  [address], [address], [place] [address]  [phone]    [employer] [facility]  [address], [facility]  P: [phone]

## 2024-05-31 NOTE — ASSESSMENT & PLAN NOTE
Chronic, uncontrolled emphysema and asthma  Trial Symbicort  Change Zyrtec to Xyzal  Continue albuterol as needed

## 2024-05-31 NOTE — ASSESSMENT & PLAN NOTE
Sometimes improves/resolves with clotrimazole but then recurs  Previous exam consistent with yeast vs lichen  Referral to GYN to consider biopsy for possible lichen vs malignancy

## 2024-06-03 DIAGNOSIS — R73.03 PREDIABETES: ICD-10-CM

## 2024-06-03 DIAGNOSIS — E53.8 B12 DEFICIENCY: Primary | ICD-10-CM

## 2024-06-03 RX ORDER — MAGNESIUM 200 MG
1 TABLET ORAL
Qty: 90 TABLET | Refills: 1 | Status: SHIPPED | OUTPATIENT
Start: 2024-06-03

## 2024-06-03 RX ORDER — MAGNESIUM 200 MG
1 TABLET ORAL
Qty: 90 TABLET | Refills: 1 | Status: SHIPPED
Start: 2024-06-03 | End: 2024-06-03

## 2024-06-11 ENCOUNTER — OFFICE VISIT (OUTPATIENT)
Dept: ORTHOPEDIC SURGERY | Age: 89
End: 2024-06-11
Payer: MEDICARE

## 2024-06-11 VITALS — BODY MASS INDEX: 30.29 KG/M2 | HEIGHT: 67 IN | WEIGHT: 193 LBS

## 2024-06-11 DIAGNOSIS — M17.0 BILATERAL PRIMARY OSTEOARTHRITIS OF KNEE: Primary | ICD-10-CM

## 2024-06-11 PROCEDURE — 99213 OFFICE O/P EST LOW 20 MIN: CPT | Performed by: STUDENT IN AN ORGANIZED HEALTH CARE EDUCATION/TRAINING PROGRAM

## 2024-06-11 PROCEDURE — 1123F ACP DISCUSS/DSCN MKR DOCD: CPT | Performed by: STUDENT IN AN ORGANIZED HEALTH CARE EDUCATION/TRAINING PROGRAM

## 2024-06-11 NOTE — PROGRESS NOTES
treatment options, answering questions, and documentation.       Kee Kolb DO   Orthopaedic Surgery   6/11/24  10:55 AM

## 2024-06-13 NOTE — PROGRESS NOTES
ESTABLISHED PRIMARY CARE VISIT    24  Name: Joann Armas   : 1935   Age: 88 y.o.  Sex: female        Assessment & Plan:     Problem List Items Addressed This Visit       Paroxysmal atrial fibrillation (HCC)     Chronic, rate- and rhythm-controlled  Continue Xarelto 15 mg daily         Seasonal allergic rhinitis     Chronic, uncontrolled  Change Zyrtec to Xyzal  Continue Flonase as needed         Relevant Medications    albuterol sulfate HFA (VENTOLIN HFA) 108 (90 Base) MCG/ACT inhaler    fluticasone (FLONASE) 50 MCG/ACT nasal spray    budesonide-formoterol (SYMBICORT) 80-4.5 MCG/ACT AERO    levocetirizine (XYZAL) 5 MG tablet    Other emphysema (HCC)     Chronic, uncontrolled emphysema and asthma  Trial Symbicort  Change Zyrtec to Xyzal  Continue albuterol as needed         Relevant Medications    albuterol sulfate HFA (VENTOLIN HFA) 108 (90 Base) MCG/ACT inhaler    fluticasone (FLONASE) 50 MCG/ACT nasal spray    budesonide-formoterol (SYMBICORT) 80-4.5 MCG/ACT AERO    levocetirizine (XYZAL) 5 MG tablet    Generalized anxiety disorder     Chronic, controlled  Continue Celexa 20 mg daily         Relevant Medications    citalopram (CELEXA) 20 MG tablet    Multinodular thyroid    Relevant Orders    TSH    T4, Free    Moderate aortic valve stenosis     Mild-moderate aortic stenosis on most recent echo  Following with cardiology         Stage 3b chronic kidney disease (CKD) (HCC)     Chronic, stable, stage 3a-b  Recheck  Avoid nephrotoxic medications  Maintain hydration         Relevant Orders    Comprehensive Metabolic Panel    Bilateral primary osteoarthritis of knee     Chronic, moderate-severe  Following with ortho  Considering TKA         Relevant Medications    diclofenac sodium (VOLTAREN) 1 % GEL    GERD (gastroesophageal reflux disease)     Continue Protonix 40 mg daily         Relevant Medications    pantoprazole (PROTONIX) 40 MG tablet    Asthma due to environmental allergies     See 
None
None

## 2024-07-15 DIAGNOSIS — K21.9 GASTROESOPHAGEAL REFLUX DISEASE, UNSPECIFIED WHETHER ESOPHAGITIS PRESENT: ICD-10-CM

## 2024-07-15 DIAGNOSIS — F41.1 GENERALIZED ANXIETY DISORDER: ICD-10-CM

## 2024-07-15 RX ORDER — PANTOPRAZOLE SODIUM 40 MG/1
40 TABLET, DELAYED RELEASE ORAL DAILY
Qty: 90 TABLET | Refills: 1 | OUTPATIENT
Start: 2024-07-15

## 2024-07-15 RX ORDER — CITALOPRAM 20 MG/1
20 TABLET ORAL DAILY
Qty: 90 TABLET | Refills: 1 | OUTPATIENT
Start: 2024-07-15

## 2024-07-16 ENCOUNTER — OFFICE VISIT (OUTPATIENT)
Dept: ORTHOPEDIC SURGERY | Age: 89
End: 2024-07-16
Payer: MEDICARE

## 2024-07-16 VITALS — BODY MASS INDEX: 30.61 KG/M2 | HEIGHT: 67 IN | WEIGHT: 195 LBS

## 2024-07-16 DIAGNOSIS — M17.0 BILATERAL PRIMARY OSTEOARTHRITIS OF KNEE: Primary | ICD-10-CM

## 2024-07-16 PROCEDURE — 1123F ACP DISCUSS/DSCN MKR DOCD: CPT | Performed by: STUDENT IN AN ORGANIZED HEALTH CARE EDUCATION/TRAINING PROGRAM

## 2024-07-16 PROCEDURE — 99213 OFFICE O/P EST LOW 20 MIN: CPT | Performed by: STUDENT IN AN ORGANIZED HEALTH CARE EDUCATION/TRAINING PROGRAM

## 2024-07-16 PROCEDURE — 20610 DRAIN/INJ JOINT/BURSA W/O US: CPT | Performed by: STUDENT IN AN ORGANIZED HEALTH CARE EDUCATION/TRAINING PROGRAM

## 2024-07-16 RX ORDER — BUPIVACAINE HYDROCHLORIDE 2.5 MG/ML
1 INJECTION, SOLUTION INFILTRATION; PERINEURAL ONCE
Status: COMPLETED | OUTPATIENT
Start: 2024-07-16 | End: 2024-07-17

## 2024-07-16 RX ORDER — TRIAMCINOLONE ACETONIDE 40 MG/ML
40 INJECTION, SUSPENSION INTRA-ARTICULAR; INTRAMUSCULAR ONCE
Status: COMPLETED | OUTPATIENT
Start: 2024-07-16 | End: 2024-07-17

## 2024-07-16 RX ORDER — LIDOCAINE HYDROCHLORIDE 10 MG/ML
1 INJECTION, SOLUTION INFILTRATION; PERINEURAL ONCE
Status: COMPLETED | OUTPATIENT
Start: 2024-07-16 | End: 2024-07-17

## 2024-07-16 RX ORDER — CLOBETASOL PROPIONATE 0.46 MG/ML
SOLUTION TOPICAL
COMMUNITY
Start: 2024-07-01

## 2024-07-16 NOTE — PROGRESS NOTES
[facility]  ORTHOPAEDIC SURGERY   DATE OF VISIT: 07/16/24  Follow Up Visit     CHIEF COMPLAINT:   Chief Complaint   Patient presents with    Follow-up     Patient is here for 4 week f/u regarding her knees. Patient would like CSI at this visit.         Joann Armas returns today for follow up visit regarding her bilateral knee pain. Treatment thus far has included corticosteroid injection.  These continue to provide significant relief for almost 3 months.  She would like to repeat them bilaterally today    Physical Exam:     Height: 1.702 m (5' 7\"), Weight - Scale: 88.5 kg (195 lb)    General: Alert and oriented x3, no acute distress  Psych: mentation normal, non pressured speech   Cardiovascular/pulmonary: No labored breathing, peripheral perfusion intact  Musculoskeletal:    Lower Extremity:   Ipsilateral hip exam shows normal range of motion without pain with impingement testing.       Bilateral lower extremity   skin intact  Compartment soft compress  Neurovascular intact otherwise  Valgus alignment  Slight valgus pseudolaxity  Stable varus valgus  Negative drawer test  Tenderness palpation over lateral joint line bilateral  Positive patellar grind test with crepitance    Controlled Substances Monitoring:      Imaging:  X-ray bilateral knees PA flex, lateral, sunrise: Demonstrate valgus alignment with KL grade 3/4 changes in the lateral compartments as well as patellofemoral KL grade 4 joint osteoarthritis      Assessment: Bilateral knee osteoarthritis      Plan:   Patient would like to proceed with corticosteroid injection today in office given her substantial relief with the last ones.  Plan will be to follow-up with her in 3 months for possible repeat corticosteroid injections again.  Again discussed surgical intervention however at this point time she feels injection therapy is most suitable for her given it is providing lasting symptom relief for her activity levels/demand.    The bilateral knee was

## 2024-07-17 RX ADMIN — LIDOCAINE HYDROCHLORIDE 1 ML: 10 INJECTION, SOLUTION INFILTRATION; PERINEURAL at 11:55

## 2024-07-17 RX ADMIN — BUPIVACAINE HYDROCHLORIDE 2.5 MG: 2.5 INJECTION, SOLUTION INFILTRATION; PERINEURAL at 11:55

## 2024-07-17 RX ADMIN — TRIAMCINOLONE ACETONIDE 40 MG: 40 INJECTION, SUSPENSION INTRA-ARTICULAR; INTRAMUSCULAR at 11:57

## 2024-07-17 RX ADMIN — TRIAMCINOLONE ACETONIDE 40 MG: 40 INJECTION, SUSPENSION INTRA-ARTICULAR; INTRAMUSCULAR at 11:56

## 2024-07-17 RX ADMIN — BUPIVACAINE HYDROCHLORIDE 2.5 MG: 2.5 INJECTION, SOLUTION INFILTRATION; PERINEURAL at 11:54

## 2024-07-17 RX ADMIN — LIDOCAINE HYDROCHLORIDE 1 ML: 10 INJECTION, SOLUTION INFILTRATION; PERINEURAL at 11:56

## 2024-07-22 ENCOUNTER — PROCEDURE VISIT (OUTPATIENT)
Dept: PODIATRY | Age: 89
End: 2024-07-22
Payer: MEDICARE

## 2024-07-22 VITALS — BODY MASS INDEX: 30.61 KG/M2 | WEIGHT: 195 LBS | HEIGHT: 67 IN

## 2024-07-22 DIAGNOSIS — G60.8 HEREDITARY SENSORY NEUROPATHY: ICD-10-CM

## 2024-07-22 DIAGNOSIS — Z79.01 ENCOUNTER FOR CURRENT LONG-TERM USE OF ANTICOAGULANTS: Primary | ICD-10-CM

## 2024-07-22 DIAGNOSIS — B35.1 TINEA UNGUIUM: ICD-10-CM

## 2024-07-22 PROCEDURE — 99203 OFFICE O/P NEW LOW 30 MIN: CPT | Performed by: PODIATRIST

## 2024-07-22 PROCEDURE — 11721 DEBRIDE NAIL 6 OR MORE: CPT | Performed by: PODIATRIST

## 2024-07-22 PROCEDURE — 1123F ACP DISCUSS/DSCN MKR DOCD: CPT | Performed by: PODIATRIST

## 2024-07-22 RX ORDER — AMMONIUM LACTATE 12 G/100G
LOTION TOPICAL
Qty: 400 G | Refills: 2 | Status: SHIPPED | OUTPATIENT
Start: 2024-07-22

## 2024-07-22 NOTE — PROGRESS NOTES
Patient presents today as a new patient referred by Noah Luna MD for bilateral ingrown toenails. She states that she would like to have them taken care of as she can no longer do it herself.  They were last seen by PCP, Noah Luna MD, on 2024.    Electronically signed by Kell Mix MA on 2024 at 2:10 PM        24  Joann Armas : 1935 Sex: female  Age: 88 y.o.    Patient was referred by Noah Luna MD    CC:   Painful elongated toenails 1-5 right and left    HPI  This pleasant 88-year-old female patient for me today foot and ankle exam.  Does have a history of anticoagulant therapy Xarelto.  Difficulty  managing toenails.  Denies history of diabetes.  Here today for painful elongated toenails 1-5 right and left.    No additional pedal complaints at this time.    ROS:  Const: Denies constitutional symptoms  Musculo: Denies symptoms other than stated above  Skin: Denies symptoms other than stated above      Current Outpatient Medications:     clobetasol (TEMOVATE) 0.05 % external solution, , Disp: , Rfl:     Cyanocobalamin (B-12) 1000 MCG SUBL, Place 1 tablet under the tongue daily (with breakfast), Disp: 90 tablet, Rfl: 1    netarsudil-Latanoprost (ROCKLATAN) 0.02-0.005 % ophthalmic solution, Place 1 drop into both eyes nightly, Disp: , Rfl:     albuterol sulfate HFA (VENTOLIN HFA) 108 (90 Base) MCG/ACT inhaler, Inhale 2 puffs into the lungs every 4 hours as needed for Wheezing or Shortness of Breath (coughing), Disp: 18 g, Rfl: 5    citalopram (CELEXA) 20 MG tablet, Take 1 tablet by mouth daily, Disp: 90 tablet, Rfl: 1    clotrimazole (LOTRIMIN) 1 % vaginal cream, Apply topically for 1-2 weeks., Disp: 45 g, Rfl: 0    diclofenac sodium (VOLTAREN) 1 % GEL, Apply 4 g topically 4 times daily as needed for Pain, Disp: 150 g, Rfl: 0    fluticasone (FLONASE) 50 MCG/ACT nasal spray, 2 sprays by Nasal route daily, Disp: 3 each, Rfl: 1    pantoprazole (PROTONIX) 40 MG tablet,  Pt here for BP check  Coreg had been increased per Dr Triplett

## 2024-08-03 DIAGNOSIS — F41.1 GENERALIZED ANXIETY DISORDER: ICD-10-CM

## 2024-08-03 DIAGNOSIS — K21.9 GASTROESOPHAGEAL REFLUX DISEASE, UNSPECIFIED WHETHER ESOPHAGITIS PRESENT: ICD-10-CM

## 2024-08-05 RX ORDER — PANTOPRAZOLE SODIUM 40 MG/1
40 TABLET, DELAYED RELEASE ORAL DAILY
Qty: 90 TABLET | Refills: 0 | Status: SHIPPED | OUTPATIENT
Start: 2024-08-05

## 2024-08-05 RX ORDER — CITALOPRAM 20 MG/1
20 TABLET ORAL DAILY
Qty: 90 TABLET | Refills: 0 | Status: SHIPPED | OUTPATIENT
Start: 2024-08-05

## 2024-08-20 LAB
LEFT VENTRICULAR EJECTION FRACTION HIGH VALUE: NORMAL
LEFT VENTRICULAR EJECTION FRACTION MODE: NORMAL
LV EF: NORMAL %

## 2024-08-28 ENCOUNTER — OFFICE VISIT (OUTPATIENT)
Dept: PRIMARY CARE CLINIC | Age: 89
End: 2024-08-28
Payer: MEDICARE

## 2024-08-28 VITALS
RESPIRATION RATE: 20 BRPM | OXYGEN SATURATION: 94 % | BODY MASS INDEX: 30.42 KG/M2 | WEIGHT: 193.8 LBS | SYSTOLIC BLOOD PRESSURE: 114 MMHG | TEMPERATURE: 97.6 F | HEART RATE: 78 BPM | DIASTOLIC BLOOD PRESSURE: 60 MMHG | HEIGHT: 67 IN

## 2024-08-28 DIAGNOSIS — J43.8 OTHER EMPHYSEMA (HCC): ICD-10-CM

## 2024-08-28 DIAGNOSIS — K21.9 GASTROESOPHAGEAL REFLUX DISEASE, UNSPECIFIED WHETHER ESOPHAGITIS PRESENT: ICD-10-CM

## 2024-08-28 DIAGNOSIS — R73.03 PREDIABETES: ICD-10-CM

## 2024-08-28 DIAGNOSIS — E53.8 B12 DEFICIENCY: ICD-10-CM

## 2024-08-28 DIAGNOSIS — J30.2 SEASONAL ALLERGIC RHINITIS, UNSPECIFIED TRIGGER: ICD-10-CM

## 2024-08-28 DIAGNOSIS — F41.1 GENERALIZED ANXIETY DISORDER: ICD-10-CM

## 2024-08-28 DIAGNOSIS — N18.32 STAGE 3B CHRONIC KIDNEY DISEASE (CKD) (HCC): ICD-10-CM

## 2024-08-28 DIAGNOSIS — I35.0 MODERATE AORTIC VALVE STENOSIS: Primary | ICD-10-CM

## 2024-08-28 DIAGNOSIS — I48.0 PAROXYSMAL ATRIAL FIBRILLATION (HCC): ICD-10-CM

## 2024-08-28 LAB
ANION GAP SERPL CALCULATED.3IONS-SCNC: 11 MMOL/L (ref 7–16)
BUN BLDV-MCNC: 18 MG/DL (ref 6–23)
CALCIUM SERPL-MCNC: 9.6 MG/DL (ref 8.6–10.2)
CHLORIDE BLD-SCNC: 105 MMOL/L (ref 98–107)
CO2: 24 MMOL/L (ref 22–29)
CREAT SERPL-MCNC: 1.1 MG/DL (ref 0.5–1)
FOLATE: 18.7 NG/ML (ref 4.8–24.2)
GFR, ESTIMATED: 48 ML/MIN/1.73M2
GLUCOSE BLD-MCNC: 101 MG/DL (ref 74–99)
HBA1C MFR BLD: 5.6 % (ref 4–5.6)
POTASSIUM SERPL-SCNC: 4.9 MMOL/L (ref 3.5–5)
SODIUM BLD-SCNC: 140 MMOL/L (ref 132–146)
VITAMIN B-12: 654 PG/ML (ref 211–946)

## 2024-08-28 PROCEDURE — 99214 OFFICE O/P EST MOD 30 MIN: CPT | Performed by: STUDENT IN AN ORGANIZED HEALTH CARE EDUCATION/TRAINING PROGRAM

## 2024-08-28 PROCEDURE — 1123F ACP DISCUSS/DSCN MKR DOCD: CPT | Performed by: STUDENT IN AN ORGANIZED HEALTH CARE EDUCATION/TRAINING PROGRAM

## 2024-08-28 PROCEDURE — G2211 COMPLEX E/M VISIT ADD ON: HCPCS | Performed by: STUDENT IN AN ORGANIZED HEALTH CARE EDUCATION/TRAINING PROGRAM

## 2024-08-28 RX ORDER — MAGNESIUM 200 MG
1 TABLET ORAL
Qty: 90 TABLET | Refills: 1 | Status: SHIPPED | OUTPATIENT
Start: 2024-08-28

## 2024-08-28 RX ORDER — PANTOPRAZOLE SODIUM 40 MG/1
40 TABLET, DELAYED RELEASE ORAL DAILY
Qty: 90 TABLET | Refills: 1 | Status: SHIPPED | OUTPATIENT
Start: 2024-08-28

## 2024-08-28 RX ORDER — LEVOCETIRIZINE DIHYDROCHLORIDE 5 MG/1
5 TABLET, FILM COATED ORAL NIGHTLY
Qty: 90 TABLET | Refills: 1 | Status: SHIPPED | OUTPATIENT
Start: 2024-08-28

## 2024-08-28 RX ORDER — CITALOPRAM HYDROBROMIDE 20 MG/1
20 TABLET ORAL DAILY
Qty: 90 TABLET | Refills: 1 | Status: SHIPPED | OUTPATIENT
Start: 2024-08-28

## 2024-08-28 ASSESSMENT — ENCOUNTER SYMPTOMS
CONSTIPATION: 0
SHORTNESS OF BREATH: 0
BLOOD IN STOOL: 0
COUGH: 0
DIARRHEA: 0
WHEEZING: 0

## 2024-08-28 NOTE — PROGRESS NOTES
ESTABLISHED PRIMARY CARE VISIT    24  Name: Joann Armas   : 1935   Age: 88 y.o.  Sex: female        Assessment & Plan:     Problem List Items Addressed This Visit       Paroxysmal atrial fibrillation (HCC)     Chronic, rate- and rhythm-controlled  Following with cardiology  Continue Xarelto 15 mg daily         Seasonal allergic rhinitis     Chronic, controlled  Continue Xyzal, Flonase as needed         Relevant Medications    levocetirizine (XYZAL) 5 MG tablet    Other emphysema (HCC)     Chronic, controlled  Stopped Symbicort due to improvement  Continue Xyzal, albuterol as needed         Relevant Medications    levocetirizine (XYZAL) 5 MG tablet    Generalized anxiety disorder     Chronic, controlled  Continue Celexa 20 mg daily         Relevant Medications    citalopram (CELEXA) 20 MG tablet    Moderate aortic valve stenosis - Primary     Surgery referral pending due to echo  Results requested  Suspect has progressed to severe  Following with cardiology         Stage 3b chronic kidney disease (CKD) (HCC)     Chronic, stable, stage 3a-b  Recheck  Avoid nephrotoxic medications  Maintain hydration         Relevant Orders    Basic Metabolic Panel    GERD (gastroesophageal reflux disease)     Continue Protonix 40 mg daily         Relevant Medications    pantoprazole (PROTONIX) 40 MG tablet    B12 deficiency     Continue supplement  Recheck          Relevant Medications    Cyanocobalamin (B-12) 1000 MCG SUBL    Other Relevant Orders    Vitamin B12 & Folate    Prediabetes     Recheck         Relevant Orders    Hemoglobin A1C     Counseled patient regarding above diagnosis, including possible risks and complications, especially if left uncontrolled.  Counseled patient as appropriate and relevant regarding any possible side effects, risks, and alternatives to treatment; the patient verbalizes understanding, and is in agreement with the plan as detailed above.   All educational materials and  tablet, Rfl: 0    pantoprazole (PROTONIX) 40 MG tablet, Take 1 tablet by mouth daily, Disp: 90 tablet, Rfl: 0    ammonium lactate (LAC-HYDRIN) 12 % lotion, Apply topically twice daily, Disp: 400 g, Rfl: 2    clobetasol (TEMOVATE) 0.05 % external solution, , Disp: , Rfl:     Cyanocobalamin (B-12) 1000 MCG SUBL, Place 1 tablet under the tongue daily (with breakfast), Disp: 90 tablet, Rfl: 1    albuterol sulfate HFA (VENTOLIN HFA) 108 (90 Base) MCG/ACT inhaler, Inhale 2 puffs into the lungs every 4 hours as needed for Wheezing or Shortness of Breath (coughing), Disp: 18 g, Rfl: 5    diclofenac sodium (VOLTAREN) 1 % GEL, Apply 4 g topically 4 times daily as needed for Pain, Disp: 150 g, Rfl: 0    fluticasone (FLONASE) 50 MCG/ACT nasal spray, 2 sprays by Nasal route daily, Disp: 3 each, Rfl: 1    levocetirizine (XYZAL) 5 MG tablet, Take 1 tablet by mouth nightly, Disp: 90 tablet, Rfl: 1    XARELTO 15 MG TABS tablet, Take 1 tablet by mouth daily, Disp: 90 tablet, Rfl: 0    ALPHAGAN P 0.1 % SOLN, Apply 1 drop to eye 2 times daily, Disp: , Rfl:     ciprofloxacin (CILOXAN) 0.3 % ophthalmic solution, INSTILL 1 DROP INTO RIGHT EYE 4 TIMES DAILY, Disp: , Rfl:     Handicap Placard MISC, by Does not apply route Expires 12/01/33, Disp: 1 each, Rfl: 0    bisacodyl (DULCOLAX) 5 MG EC tablet, Take 1 tablet by mouth daily as needed, Disp: , Rfl:     Glycerin, Laxative, (GLYCERIN ADULT) 2 g SUPP, Place rectally, Disp: , Rfl:     ALPRAZolam (XANAX) 0.25 MG tablet, Take 1 tablet by mouth daily as needed., Disp: , Rfl:     Bacillus Coagulans-Inulin (PROBIOTIC) 1-250 BILLION-MG CAPS, Take 1 capsule by mouth daily, Disp: , Rfl:     netarsudil-Latanoprost (ROCKLATAN) 0.02-0.005 % ophthalmic solution, Place 1 drop into both eyes nightly (Patient not taking: Reported on 8/28/2024), Disp: , Rfl:     clotrimazole (LOTRIMIN) 1 % vaginal cream, Apply topically for 1-2 weeks. (Patient not taking: Reported on 8/28/2024), Disp: 45 g, Rfl: 0

## 2024-08-28 NOTE — ASSESSMENT & PLAN NOTE
Surgery referral pending due to echo  Results requested  Suspect has progressed to severe  Following with cardiology

## 2024-10-17 ENCOUNTER — OFFICE VISIT (OUTPATIENT)
Dept: ORTHOPEDIC SURGERY | Age: 89
End: 2024-10-17

## 2024-10-17 DIAGNOSIS — M17.0 BILATERAL PRIMARY OSTEOARTHRITIS OF KNEE: Primary | ICD-10-CM

## 2024-10-17 RX ORDER — TRIAMCINOLONE ACETONIDE 40 MG/ML
40 INJECTION, SUSPENSION INTRA-ARTICULAR; INTRAMUSCULAR ONCE
Status: COMPLETED | OUTPATIENT
Start: 2024-10-17 | End: 2024-10-17

## 2024-10-17 RX ORDER — LIDOCAINE HYDROCHLORIDE 10 MG/ML
1 INJECTION, SOLUTION INFILTRATION; PERINEURAL ONCE
Status: COMPLETED | OUTPATIENT
Start: 2024-10-17 | End: 2024-10-17

## 2024-10-17 RX ORDER — BUPIVACAINE HYDROCHLORIDE 2.5 MG/ML
1 INJECTION, SOLUTION INFILTRATION; PERINEURAL ONCE
Status: COMPLETED | OUTPATIENT
Start: 2024-10-17 | End: 2024-10-17

## 2024-10-17 RX ADMIN — LIDOCAINE HYDROCHLORIDE 1 ML: 10 INJECTION, SOLUTION INFILTRATION; PERINEURAL at 13:54

## 2024-10-17 RX ADMIN — TRIAMCINOLONE ACETONIDE 40 MG: 40 INJECTION, SUSPENSION INTRA-ARTICULAR; INTRAMUSCULAR at 13:56

## 2024-10-17 RX ADMIN — BUPIVACAINE HYDROCHLORIDE 2.5 MG: 2.5 INJECTION, SOLUTION INFILTRATION; PERINEURAL at 13:53

## 2024-10-17 RX ADMIN — TRIAMCINOLONE ACETONIDE 40 MG: 40 INJECTION, SUSPENSION INTRA-ARTICULAR; INTRAMUSCULAR at 13:54

## 2024-10-17 RX ADMIN — BUPIVACAINE HYDROCHLORIDE 2.5 MG: 2.5 INJECTION, SOLUTION INFILTRATION; PERINEURAL at 13:54

## 2024-10-17 NOTE — PROGRESS NOTES
[facility]  ORTHOPAEDIC SURGERY   DATE OF VISIT: 10/17/24  Follow Up Visit     CHIEF COMPLAINT:   Chief Complaint   Patient presents with    Follow-up     F/u on bl knees last csi were 07/16/24, she wants another set of injections today         Joann Armas returns today for follow up visit regarding her bilateral knee pain. Treatment thus far has included corticosteroid injection.  These continue to provide significant relief for almost 3 months.  She would like to repeat them bilaterally today.  Of note she is scheduled for a heart procedure at the end of the year.    Physical Exam:     No data recorded    General: Alert and oriented x3, no acute distress  Psych: mentation normal, non pressured speech   Cardiovascular/pulmonary: No labored breathing, peripheral perfusion intact  Musculoskeletal:    Lower Extremity:   Ipsilateral hip exam shows normal range of motion without pain with impingement testing.       Bilateral lower extremity   skin intact  Compartment soft compress  Neurovascular intact otherwise  Valgus alignment  Slight valgus pseudolaxity  Stable varus valgus  Negative drawer test  Tenderness palpation over lateral joint line bilateral  Positive patellar grind test with crepitance    Controlled Substances Monitoring:      Imaging:  X-ray bilateral knees PA flex, lateral, sunrise: Demonstrate valgus alignment with KL grade 3/4 changes in the lateral compartments as well as patellofemoral KL grade 4 joint osteoarthritis      Assessment: Bilateral knee osteoarthritis      Plan:   Patient would like to proceed with corticosteroid injection today in office given her substantial relief with the last ones.  Plan will be to follow-up with her in 3 months for possible repeat corticosteroid injections again.      We did discuss that candidacy for total knee arthroplasty is low due to her cardiac risk.  We will continue with conservative measures at this point.    Follow-up in 3 months for repeat evaluation and

## 2024-12-16 ENCOUNTER — OFFICE VISIT (OUTPATIENT)
Dept: PRIMARY CARE CLINIC | Age: 89
End: 2024-12-16
Payer: MEDICARE

## 2024-12-16 VITALS
RESPIRATION RATE: 20 BRPM | TEMPERATURE: 96.8 F | OXYGEN SATURATION: 96 % | DIASTOLIC BLOOD PRESSURE: 74 MMHG | HEART RATE: 75 BPM | SYSTOLIC BLOOD PRESSURE: 126 MMHG | WEIGHT: 196.4 LBS | BODY MASS INDEX: 30.83 KG/M2 | HEIGHT: 67 IN

## 2024-12-16 DIAGNOSIS — K21.9 GASTROESOPHAGEAL REFLUX DISEASE, UNSPECIFIED WHETHER ESOPHAGITIS PRESENT: ICD-10-CM

## 2024-12-16 DIAGNOSIS — J30.2 SEASONAL ALLERGIC RHINITIS, UNSPECIFIED TRIGGER: ICD-10-CM

## 2024-12-16 DIAGNOSIS — I48.0 PAROXYSMAL ATRIAL FIBRILLATION (HCC): Primary | ICD-10-CM

## 2024-12-16 DIAGNOSIS — F41.1 GENERALIZED ANXIETY DISORDER: ICD-10-CM

## 2024-12-16 PROCEDURE — 1159F MED LIST DOCD IN RCRD: CPT | Performed by: STUDENT IN AN ORGANIZED HEALTH CARE EDUCATION/TRAINING PROGRAM

## 2024-12-16 PROCEDURE — 1123F ACP DISCUSS/DSCN MKR DOCD: CPT | Performed by: STUDENT IN AN ORGANIZED HEALTH CARE EDUCATION/TRAINING PROGRAM

## 2024-12-16 PROCEDURE — 99214 OFFICE O/P EST MOD 30 MIN: CPT | Performed by: STUDENT IN AN ORGANIZED HEALTH CARE EDUCATION/TRAINING PROGRAM

## 2024-12-16 PROCEDURE — G2211 COMPLEX E/M VISIT ADD ON: HCPCS | Performed by: STUDENT IN AN ORGANIZED HEALTH CARE EDUCATION/TRAINING PROGRAM

## 2024-12-16 RX ORDER — PANTOPRAZOLE SODIUM 40 MG/1
40 TABLET, DELAYED RELEASE ORAL DAILY
Qty: 90 TABLET | Refills: 1 | Status: SHIPPED | OUTPATIENT
Start: 2024-12-16

## 2024-12-16 RX ORDER — CITALOPRAM HYDROBROMIDE 20 MG/1
20 TABLET ORAL DAILY
Qty: 90 TABLET | Refills: 1 | Status: SHIPPED | OUTPATIENT
Start: 2024-12-16

## 2024-12-16 RX ORDER — FLUTICASONE PROPIONATE 50 MCG
2 SPRAY, SUSPENSION (ML) NASAL DAILY
Qty: 3 EACH | Refills: 1 | Status: SHIPPED | OUTPATIENT
Start: 2024-12-16

## 2024-12-16 NOTE — PROGRESS NOTES
Base) MCG/ACT inhaler Inhale 2 puffs into the lungs every 4 hours as needed for Wheezing or Shortness of Breath (coughing) 18 g 5    XARELTO 15 MG TABS tablet Take 1 tablet by mouth daily 90 tablet 0    acetaZOLAMIDE (DIAMOX) 250 MG tablet Take 1 tablet by mouth 2 times daily      Handicap Placard MISC by Does not apply route Expires 12/01/33 1 each 0    Glycerin, Laxative, (GLYCERIN ADULT) 2 g SUPP Place rectally      ALPRAZolam (XANAX) 0.25 MG tablet Take 1 tablet by mouth daily as needed.       No current facility-administered medications for this visit.            ROS   Reviewed as above, otherwise negative       Physical Exam   Vitals:   Vitals:    12/16/24 1217   BP: 126/74   Pulse: 75   Resp: 20   Temp: 96.8 °F (36 °C)   SpO2: 96%       Physical Exam  Vitals reviewed.   Constitutional:       Appearance: Normal appearance.   HENT:      Head: Normocephalic and atraumatic.      Nose: Rhinorrhea present.      Right Turbinates: Enlarged.      Left Turbinates: Enlarged.   Cardiovascular:      Rate and Rhythm: Normal rate and regular rhythm.      Pulses: Normal pulses.      Heart sounds: Normal heart sounds.   Pulmonary:      Effort: Pulmonary effort is normal.      Breath sounds: Normal breath sounds.   Neurological:      Mental Status: She is alert.   Psychiatric:         Mood and Affect: Mood normal.         Behavior: Behavior normal.           Assessment and Plan       1. Paroxysmal atrial fibrillation (HCC)  Comments:  At goal.  Patient denies any recent bleeding or bruising.  Continue Xarelto 15 mg daily  2. Generalized anxiety disorder  Comments:  Stable.  Continue citalopram 20 mg daily.  Orders:  -     citalopram (CELEXA) 20 MG tablet; Take 1 tablet by mouth daily, Disp-90 tablet, R-1Normal  3. Gastroesophageal reflux disease, unspecified whether esophagitis present  Comments:  at goal.  Continue Protonix 40 mg daily.  Orders:  -     pantoprazole (PROTONIX) 40 MG tablet; Take 1 tablet by mouth daily,

## 2025-01-27 DIAGNOSIS — J30.2 SEASONAL ALLERGIC RHINITIS, UNSPECIFIED TRIGGER: ICD-10-CM

## 2025-01-27 DIAGNOSIS — F41.1 GENERALIZED ANXIETY DISORDER: ICD-10-CM

## 2025-01-27 RX ORDER — CITALOPRAM HYDROBROMIDE 20 MG/1
20 TABLET ORAL DAILY
Qty: 90 TABLET | Refills: 1 | OUTPATIENT
Start: 2025-01-27

## 2025-01-27 RX ORDER — LEVOCETIRIZINE DIHYDROCHLORIDE 5 MG/1
5 TABLET, FILM COATED ORAL NIGHTLY
Qty: 90 TABLET | Refills: 1 | Status: SHIPPED | OUTPATIENT
Start: 2025-01-27

## 2025-01-28 ENCOUNTER — OFFICE VISIT (OUTPATIENT)
Dept: ORTHOPEDIC SURGERY | Age: 89
End: 2025-01-28
Payer: MEDICARE

## 2025-01-28 VITALS
TEMPERATURE: 97.4 F | OXYGEN SATURATION: 95 % | SYSTOLIC BLOOD PRESSURE: 107 MMHG | HEIGHT: 67 IN | RESPIRATION RATE: 14 BRPM | BODY MASS INDEX: 30.76 KG/M2 | HEART RATE: 67 BPM | DIASTOLIC BLOOD PRESSURE: 60 MMHG | WEIGHT: 196 LBS

## 2025-01-28 DIAGNOSIS — M17.0 BILATERAL PRIMARY OSTEOARTHRITIS OF KNEE: Primary | ICD-10-CM

## 2025-01-28 PROCEDURE — 20610 DRAIN/INJ JOINT/BURSA W/O US: CPT

## 2025-01-28 PROCEDURE — 99213 OFFICE O/P EST LOW 20 MIN: CPT

## 2025-01-28 PROCEDURE — 1125F AMNT PAIN NOTED PAIN PRSNT: CPT

## 2025-01-28 PROCEDURE — 1123F ACP DISCUSS/DSCN MKR DOCD: CPT

## 2025-01-28 PROCEDURE — 1159F MED LIST DOCD IN RCRD: CPT

## 2025-01-28 RX ORDER — TRIAMCINOLONE ACETONIDE 40 MG/ML
40 INJECTION, SUSPENSION INTRA-ARTICULAR; INTRAMUSCULAR ONCE
Status: COMPLETED | OUTPATIENT
Start: 2025-01-28 | End: 2025-01-28

## 2025-01-28 RX ORDER — LIDOCAINE HYDROCHLORIDE 10 MG/ML
1 INJECTION, SOLUTION INFILTRATION; PERINEURAL ONCE
Status: COMPLETED | OUTPATIENT
Start: 2025-01-28 | End: 2025-01-28

## 2025-01-28 RX ORDER — BUPIVACAINE HYDROCHLORIDE 2.5 MG/ML
1 INJECTION, SOLUTION INFILTRATION; PERINEURAL ONCE
Status: COMPLETED | OUTPATIENT
Start: 2025-01-28 | End: 2025-01-28

## 2025-01-28 RX ADMIN — BUPIVACAINE HYDROCHLORIDE 2.5 MG: 2.5 INJECTION, SOLUTION INFILTRATION; PERINEURAL at 10:21

## 2025-01-28 RX ADMIN — TRIAMCINOLONE ACETONIDE 40 MG: 40 INJECTION, SUSPENSION INTRA-ARTICULAR; INTRAMUSCULAR at 10:23

## 2025-01-28 RX ADMIN — LIDOCAINE HYDROCHLORIDE 1 ML: 10 INJECTION, SOLUTION INFILTRATION; PERINEURAL at 10:22

## 2025-01-28 RX ADMIN — LIDOCAINE HYDROCHLORIDE 1 ML: 10 INJECTION, SOLUTION INFILTRATION; PERINEURAL at 10:21

## 2025-01-28 RX ADMIN — TRIAMCINOLONE ACETONIDE 40 MG: 40 INJECTION, SUSPENSION INTRA-ARTICULAR; INTRAMUSCULAR at 10:22

## 2025-01-28 NOTE — PROGRESS NOTES
[facility]  ORTHOPAEDIC SURGERY   DATE OF VISIT: 01/28/25  Follow Up Visit     CHIEF COMPLAINT:   Chief Complaint   Patient presents with    Follow-up     3 mo f/u dontrell knee pain. Patient would like CSI today         Joann Armas returns today for follow up visit regarding her bilateral knee pain. Treatment thus far has included corticosteroid injection.  These continue to provide significant relief for almost 3 months.  She would like to repeat them bilaterally today. Patient reports she was told by her cardiologist that knee surgery is out of the question.     Physical Exam:     Height: 1.702 m (5' 7\"), Weight - Scale: 88.9 kg (196 lb), BP: 107/60    General: Alert and oriented x3, no acute distress  Psych: mentation normal, non pressured speech   Cardiovascular/pulmonary: No labored breathing, peripheral perfusion intact  Musculoskeletal:    Bilateral lower extremity   skin intact  Compartment soft compress  Neurovascular intact otherwise  Valgus alignment  Slight valgus pseudolaxity  Stable varus valgus  Negative drawer test  Tenderness palpation over lateral joint line bilateral  Positive patellar grind test with crepitance    Controlled Substances Monitoring:      Imaging:  Previous imaging reviewed      Assessment: Bilateral knee osteoarthritis      Plan:   Patient would like to proceed with corticosteroid injection today in office given her substantial relief with the last ones.  Plan will be to follow-up with her in 3 months for possible repeat corticosteroid injections again.         Follow-up in 3 months for repeat evaluation and injections at that point time     The bilateral knee was identified as the injection site. The risk and benefits of a cortisone injection were explained and the patient consented to the injection. Under sterile conditions, the knee was injected with a mixture of 40 mg of Kenalog and 1mL of 0.25% Marcaine and 1 mL of 1% Lidocaine without complication. A sterile bandage was applied.

## 2025-02-28 ENCOUNTER — OFFICE VISIT (OUTPATIENT)
Dept: PRIMARY CARE CLINIC | Age: 89
End: 2025-02-28
Payer: MEDICARE

## 2025-02-28 VITALS
WEIGHT: 195 LBS | TEMPERATURE: 97.9 F | BODY MASS INDEX: 30.61 KG/M2 | HEART RATE: 93 BPM | OXYGEN SATURATION: 94 % | DIASTOLIC BLOOD PRESSURE: 64 MMHG | HEIGHT: 67 IN | SYSTOLIC BLOOD PRESSURE: 130 MMHG

## 2025-02-28 DIAGNOSIS — R13.10 DYSPHAGIA, UNSPECIFIED TYPE: ICD-10-CM

## 2025-02-28 DIAGNOSIS — I48.0 PAROXYSMAL ATRIAL FIBRILLATION (HCC): ICD-10-CM

## 2025-02-28 DIAGNOSIS — E53.8 B12 DEFICIENCY: ICD-10-CM

## 2025-02-28 DIAGNOSIS — N18.32 STAGE 3B CHRONIC KIDNEY DISEASE (CKD) (HCC): ICD-10-CM

## 2025-02-28 DIAGNOSIS — J43.8 OTHER EMPHYSEMA (HCC): ICD-10-CM

## 2025-02-28 DIAGNOSIS — E04.2 MULTINODULAR THYROID: ICD-10-CM

## 2025-02-28 DIAGNOSIS — K21.9 GASTROESOPHAGEAL REFLUX DISEASE, UNSPECIFIED WHETHER ESOPHAGITIS PRESENT: ICD-10-CM

## 2025-02-28 DIAGNOSIS — M85.89 OSTEOPENIA OF MULTIPLE SITES: ICD-10-CM

## 2025-02-28 DIAGNOSIS — I35.0 SEVERE AORTIC VALVE STENOSIS: Primary | ICD-10-CM

## 2025-02-28 DIAGNOSIS — J30.2 SEASONAL ALLERGIC RHINITIS, UNSPECIFIED TRIGGER: ICD-10-CM

## 2025-02-28 DIAGNOSIS — F41.1 GENERALIZED ANXIETY DISORDER: ICD-10-CM

## 2025-02-28 PROCEDURE — 99214 OFFICE O/P EST MOD 30 MIN: CPT | Performed by: STUDENT IN AN ORGANIZED HEALTH CARE EDUCATION/TRAINING PROGRAM

## 2025-02-28 PROCEDURE — 1160F RVW MEDS BY RX/DR IN RCRD: CPT | Performed by: STUDENT IN AN ORGANIZED HEALTH CARE EDUCATION/TRAINING PROGRAM

## 2025-02-28 PROCEDURE — G2211 COMPLEX E/M VISIT ADD ON: HCPCS | Performed by: STUDENT IN AN ORGANIZED HEALTH CARE EDUCATION/TRAINING PROGRAM

## 2025-02-28 PROCEDURE — 1159F MED LIST DOCD IN RCRD: CPT | Performed by: STUDENT IN AN ORGANIZED HEALTH CARE EDUCATION/TRAINING PROGRAM

## 2025-02-28 PROCEDURE — 1123F ACP DISCUSS/DSCN MKR DOCD: CPT | Performed by: STUDENT IN AN ORGANIZED HEALTH CARE EDUCATION/TRAINING PROGRAM

## 2025-02-28 RX ORDER — LEVOCETIRIZINE DIHYDROCHLORIDE 5 MG/1
5 TABLET, FILM COATED ORAL NIGHTLY
Qty: 90 TABLET | Refills: 1 | Status: SHIPPED | OUTPATIENT
Start: 2025-02-28

## 2025-02-28 RX ORDER — MAGNESIUM 200 MG
1 TABLET ORAL
Qty: 90 TABLET | Refills: 1 | Status: SHIPPED | OUTPATIENT
Start: 2025-02-28

## 2025-02-28 RX ORDER — LATANOPROST OPHTHALMIC SOLUTION 0.005% 50 UG/ML
SOLUTION/ DROPS TOPICAL NIGHTLY
COMMUNITY

## 2025-02-28 RX ORDER — RIVAROXABAN 15 MG/1
15 TABLET, FILM COATED ORAL DAILY
Qty: 90 TABLET | Refills: 1 | Status: SHIPPED | OUTPATIENT
Start: 2025-02-28

## 2025-02-28 RX ORDER — PANTOPRAZOLE SODIUM 40 MG/1
40 TABLET, DELAYED RELEASE ORAL DAILY
Qty: 90 TABLET | Refills: 1 | Status: SHIPPED | OUTPATIENT
Start: 2025-02-28

## 2025-02-28 RX ORDER — CITALOPRAM HYDROBROMIDE 20 MG/1
20 TABLET ORAL DAILY
Qty: 90 TABLET | Refills: 1 | Status: SHIPPED | OUTPATIENT
Start: 2025-02-28

## 2025-02-28 RX ORDER — BRINZOLAMIDE/BRIMONIDINE TARTRATE 10; 2 MG/ML; MG/ML
1 SUSPENSION/ DROPS OPHTHALMIC EVERY 8 HOURS
COMMUNITY

## 2025-02-28 SDOH — ECONOMIC STABILITY: FOOD INSECURITY: WITHIN THE PAST 12 MONTHS, THE FOOD YOU BOUGHT JUST DIDN'T LAST AND YOU DIDN'T HAVE MONEY TO GET MORE.: NEVER TRUE

## 2025-02-28 SDOH — ECONOMIC STABILITY: FOOD INSECURITY: WITHIN THE PAST 12 MONTHS, YOU WORRIED THAT YOUR FOOD WOULD RUN OUT BEFORE YOU GOT MONEY TO BUY MORE.: NEVER TRUE

## 2025-02-28 ASSESSMENT — PATIENT HEALTH QUESTIONNAIRE - PHQ9
SUM OF ALL RESPONSES TO PHQ QUESTIONS 1-9: 0
SUM OF ALL RESPONSES TO PHQ QUESTIONS 1-9: 0
1. LITTLE INTEREST OR PLEASURE IN DOING THINGS: NOT AT ALL
SUM OF ALL RESPONSES TO PHQ QUESTIONS 1-9: 0
2. FEELING DOWN, DEPRESSED OR HOPELESS: NOT AT ALL
SUM OF ALL RESPONSES TO PHQ QUESTIONS 1-9: 0

## 2025-02-28 NOTE — ASSESSMENT & PLAN NOTE
With solids, improves with water  May be related to reflux or thyroid nodules  Recheck thyroid US  Discussed swallow study and GI referral, patient declines  Continue pantoprazole 40 mg daily

## 2025-02-28 NOTE — ASSESSMENT & PLAN NOTE
US 2021 with heterogeneous thyroid gland with multiple nodules on left, largest 3.5 mm in diameter  CT chest 2021 with nodule off inferior pole left thyroid with several small calcifications, stable since 2019, not amenable to percutaneous sampling  Recheck for 5-year stability (now 6 years)

## 2025-02-28 NOTE — PROGRESS NOTES
ESTABLISHED PRIMARY CARE VISIT    25  Name: Joann Armas   : 1935   Age: 89 y.o.  Sex: female        Assessment & Plan:     Problem List Items Addressed This Visit       Paroxysmal atrial fibrillation (HCC)    Chronic, rate- and rhythm-controlled  Following with cardiology  Continue Xarelto 15 mg daily         Relevant Medications    XARELTO 15 MG TABS tablet    Seasonal allergic rhinitis    Chronic, controlled  Continue Xyzal, Flonase as needed         Relevant Medications    levocetirizine (XYZAL) 5 MG tablet    Other emphysema (HCC)    Chronic, controlled  Continue Xyzal, albuterol as needed         Relevant Medications    levocetirizine (XYZAL) 5 MG tablet    Generalized anxiety disorder    Chronic, controlled  Continue Celexa 20 mg daily         Relevant Medications    citalopram (CELEXA) 20 MG tablet    Multinodular thyroid    US  with heterogeneous thyroid gland with multiple nodules on left, largest 3.5 mm in diameter  CT chest  with nodule off inferior pole left thyroid with several small calcifications, stable since 2019, not amenable to percutaneous sampling  Recheck for 5-year stability (now 6 years)         Relevant Orders    US THYROID    Osteopenia of multiple sites    Last DEXA 2021, showed osteopenia  Declines monitoring or treatment         Severe aortic valve stenosis - Primary    Chronic, severe  Evaluated by surgeon, declines for now  Following with cardiology         Relevant Medications    XARELTO 15 MG TABS tablet    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

## 2025-04-10 ENCOUNTER — TELEPHONE (OUTPATIENT)
Dept: PRIMARY CARE CLINIC | Age: 89
End: 2025-04-10

## 2025-04-10 NOTE — TELEPHONE ENCOUNTER
Pt called for a diuretic because she is swelling. Spoke to Dr. Luna advised to call Dr. Kelly for this medication

## 2025-04-15 ENCOUNTER — TELEPHONE (OUTPATIENT)
Dept: PRIMARY CARE CLINIC | Age: 89
End: 2025-04-15

## 2025-04-15 NOTE — TELEPHONE ENCOUNTER
Negrita cardona [facility] called and asked if you would sign for orders for this patient. She said she is at [facility] right now for anemia, and hopefully will come home in a few days and they are asking for PT and OT orders.

## 2025-04-21 PROBLEM — N28.89 LEFT KIDNEY MASS: Status: ACTIVE | Noted: 2025-04-21

## 2025-04-28 ENCOUNTER — RESULTS FOLLOW-UP (OUTPATIENT)
Dept: PRIMARY CARE CLINIC | Age: 89
End: 2025-04-28

## 2025-04-28 ENCOUNTER — TELEPHONE (OUTPATIENT)
Dept: PRIMARY CARE CLINIC | Age: 89
End: 2025-04-28

## 2025-04-28 ENCOUNTER — OFFICE VISIT (OUTPATIENT)
Dept: PRIMARY CARE CLINIC | Age: 89
End: 2025-04-28
Payer: MEDICARE

## 2025-04-28 VITALS
HEIGHT: 67 IN | RESPIRATION RATE: 18 BRPM | WEIGHT: 187 LBS | DIASTOLIC BLOOD PRESSURE: 70 MMHG | TEMPERATURE: 98.1 F | BODY MASS INDEX: 29.35 KG/M2 | OXYGEN SATURATION: 92 % | HEART RATE: 93 BPM | SYSTOLIC BLOOD PRESSURE: 134 MMHG

## 2025-04-28 DIAGNOSIS — Z09 HOSPITAL DISCHARGE FOLLOW-UP: Primary | ICD-10-CM

## 2025-04-28 DIAGNOSIS — K55.20 GI AVM (GASTROINTESTINAL ARTERIOVENOUS VASCULAR MALFORMATION): ICD-10-CM

## 2025-04-28 DIAGNOSIS — I35.0 SEVERE AORTIC VALVE STENOSIS: ICD-10-CM

## 2025-04-28 DIAGNOSIS — D50.9 IRON DEFICIENCY ANEMIA, UNSPECIFIED IRON DEFICIENCY ANEMIA TYPE: ICD-10-CM

## 2025-04-28 DIAGNOSIS — K92.2 GASTROINTESTINAL HEMORRHAGE, UNSPECIFIED GASTROINTESTINAL HEMORRHAGE TYPE: ICD-10-CM

## 2025-04-28 LAB
ANISOCYTOSIS: NORMAL
BASOPHILS ABSOLUTE: 0.1 K/UL (ref 0–0.2)
BASOPHILS RELATIVE PERCENT: 1.2 % (ref 0–1.5)
EOSINOPHILS ABSOLUTE: 0.1 K/UL (ref 0–0.33)
EOSINOPHILS RELATIVE PERCENT: 2.4 % (ref 0–3)
HCT VFR BLD CALC: 32.3 % (ref 36–44)
HEMOGLOBIN: 10.1 G/DL (ref 12–15)
HYPOCHROMIA: NORMAL
LYMPHOCYTES ABSOLUTE: 1.6 K/UL (ref 1.1–4.8)
LYMPHOCYTES RELATIVE PERCENT: 26.3 % (ref 24–44)
MCH RBC QN AUTO: 24.3 PG (ref 28–34)
MCHC RBC AUTO-ENTMCNC: 31.4 G/DL (ref 33–37)
MCV RBC AUTO: 77.4 FL (ref 80–100)
MICROCYTES: NORMAL
MONOCYTES ABSOLUTE: 0.4 K/UL (ref 0.2–0.7)
MONOCYTES RELATIVE PERCENT: 7 % (ref 3.4–9)
NEUTROPHILS ABSOLUTE: 3.8 K/UL (ref 1.83–8.7)
NEUTROPHILS RELATIVE PERCENT: 63.1 % (ref 40–74)
OVALOCYTES: NORMAL
PDW BLD-RTO: 25 % (ref 10.9–14.3)
PLATELET # BLD: 426 K/UL (ref 150–450)
PLATELET ESTIMATE: NORMAL
PMV BLD AUTO: 8.5 FL (ref 7.4–10.4)
POIKILOCYTES: NORMAL
RBC # BLD: 4.17 M/UL (ref 4–4.9)
RBC # BLD: NORMAL 10*6/UL
REFLEX: ABNORMAL
WBC # BLD: 6 K/UL (ref 4.5–11)

## 2025-04-28 PROCEDURE — 99214 OFFICE O/P EST MOD 30 MIN: CPT | Performed by: STUDENT IN AN ORGANIZED HEALTH CARE EDUCATION/TRAINING PROGRAM

## 2025-04-28 PROCEDURE — 1111F DSCHRG MED/CURRENT MED MERGE: CPT | Performed by: STUDENT IN AN ORGANIZED HEALTH CARE EDUCATION/TRAINING PROGRAM

## 2025-04-28 PROCEDURE — G2211 COMPLEX E/M VISIT ADD ON: HCPCS | Performed by: STUDENT IN AN ORGANIZED HEALTH CARE EDUCATION/TRAINING PROGRAM

## 2025-04-28 PROCEDURE — 1123F ACP DISCUSS/DSCN MKR DOCD: CPT | Performed by: STUDENT IN AN ORGANIZED HEALTH CARE EDUCATION/TRAINING PROGRAM

## 2025-04-28 RX ORDER — FUROSEMIDE 20 MG/1
20 TABLET ORAL DAILY
COMMUNITY
Start: 2025-04-11

## 2025-04-28 RX ORDER — FERROUS SULFATE 325(65) MG
325 TABLET, DELAYED RELEASE (ENTERIC COATED) ORAL EVERY OTHER DAY
Qty: 45 TABLET | Refills: 0 | Status: SHIPPED | OUTPATIENT
Start: 2025-04-28

## 2025-04-28 NOTE — TELEPHONE ENCOUNTER
Pt called asking if you are planning to order blood work at her appt today, because visiting nurses are wanting to draw blood right now. I advised if they want to do blood work, that is ok.     She also states she has an appt with Dr Benson today at 4:00 and asked if she can come in earlier than 2:15.     Please advise.

## 2025-04-28 NOTE — TELEPHONE ENCOUNTER
I am just now seeing this message (not sent high priority) and suspect it is too late to change her orders for labs this morning.

## 2025-04-28 NOTE — PROGRESS NOTES
Post-Discharge Transitional Care Management Progress Note      Joann Armas   YOB: 1935    Date of Office Visit:  4/28/2025  Date of Hospital Admission: 4/13/2025  Date of Hospital Discharge: 4/19/2205    Care management risk score Rising risk (score 2-5) and Complex Care (Scores >=6): No Risk Score On File     Non face to face  following discharge, date last encounter closed (first attempt may have been earlier): *No documented post hospital discharge outreach found in the last 14 days *No documented post hospital discharge outreach found in the last 14 days    Call initiated 2 business days of discharge: *No response recorded in the last 14 days    ASSESSMENT/PLAN:   Hospital discharge follow-up  -     IA DISCHARGE MEDS RECONCILED W/ CURRENT OUTPATIENT MED LIST  Gastrointestinal hemorrhage, unspecified gastrointestinal hemorrhage type  -     ferrous sulfate (FE TABS) 325 (65 Fe) MG EC tablet; Take 1 tablet by mouth every other day, Disp-45 tablet, R-0Normal  GI AVM (gastrointestinal arteriovenous vascular malformation)  -     ferrous sulfate (FE TABS) 325 (65 Fe) MG EC tablet; Take 1 tablet by mouth every other day, Disp-45 tablet, R-0Normal  Iron deficiency anemia, unspecified iron deficiency anemia type  -     ferrous sulfate (FE TABS) 325 (65 Fe) MG EC tablet; Take 1 tablet by mouth every other day, Disp-45 tablet, R-0Normal  Severe aortic valve stenosis    Patient seen today for hospital follow-up due to GI bleeding which resulted in severe anemia requiring blood transfusion.  She was found to have AVM which was cauterized and polyp which was removed during endoscopy.  Her hemoglobin is currently stable and being followed at home by home health.  She has continued to have some bright red blood per rectum.  She is scheduled for follow-up today with GI.  Start iron supplementation.    Follow-up with [facility] for severe aortic valve stenosis.    Medical Decision Making: moderate

## 2025-05-06 ENCOUNTER — TELEPHONE (OUTPATIENT)
Dept: PRIMARY CARE CLINIC | Age: 89
End: 2025-05-06

## 2025-05-06 NOTE — TELEPHONE ENCOUNTER
Yani from [facility] called regarding pt. She saw Karlos Benson for hemorrhoids and passing bright red blood. She has been holding the xarelto but is still having black stools. She had a CBC yesterday at [facility], her H&H was 32.5/9.5. [facility] has an order from [facility] for lab draws on Monday and Wednesday, she wants to know if want you blood work tomorrow since she had it yesterday and if you wanted to change the draws to once weekly. Pt will also need to know when to start xarelto again.

## 2025-05-07 ENCOUNTER — RESULTS FOLLOW-UP (OUTPATIENT)
Dept: PRIMARY CARE CLINIC | Age: 89
End: 2025-05-07

## 2025-05-07 LAB
ANISOCYTOSIS: NORMAL
BASOPHILS ABSOLUTE: 0.1 K/UL (ref 0–0.2)
BASOPHILS RELATIVE PERCENT: 1.2 % (ref 0–1.5)
ELLIPTOCYTES: NORMAL
EOSINOPHILS ABSOLUTE: 0.2 K/UL (ref 0–0.33)
EOSINOPHILS RELATIVE PERCENT: 2.3 % (ref 0–3)
HCT VFR BLD CALC: 29.1 % (ref 36–44)
HEMOGLOBIN: 9.4 G/DL (ref 12–15)
HYPOCHROMIA: NORMAL
LYMPHOCYTES ABSOLUTE: 2.1 K/UL (ref 1.1–4.8)
LYMPHOCYTES RELATIVE PERCENT: 28 % (ref 24–44)
MCH RBC QN AUTO: 25.1 PG (ref 28–34)
MCHC RBC AUTO-ENTMCNC: 32.2 G/DL (ref 33–37)
MCV RBC AUTO: 77.8 FL (ref 80–100)
MONOCYTES ABSOLUTE: 0.5 K/UL (ref 0.2–0.7)
MONOCYTES RELATIVE PERCENT: 6.9 % (ref 3.4–9)
NEUTROPHILS ABSOLUTE: 4.6 K/UL (ref 1.83–8.7)
NEUTROPHILS RELATIVE PERCENT: 61.6 % (ref 40–74)
PDW BLD-RTO: 25.9 % (ref 10.9–14.3)
PLATELET # BLD: 302 K/UL (ref 150–450)
PLATELET ESTIMATE: NORMAL
PMV BLD AUTO: 8.9 FL (ref 7.4–10.4)
POIKILOCYTES: NORMAL
POLYCHROMASIA: NORMAL
RBC # BLD: 3.74 M/UL (ref 4–4.9)
RBC # BLD: NORMAL 10*6/UL
REFLEX: ABNORMAL
WBC # BLD: 7.4 K/UL (ref 4.5–11)

## 2025-05-07 NOTE — TELEPHONE ENCOUNTER
I was under impression patient was holding the Xarelto for upcoming TAVR procedure. I am not sure when this was scheduled. She had her pre-op testing yesterday. They may need to confirm this with her cardiologist on if she should continue to hold Xarelto or when procedure is. Typically held 7 days prior.    I would recommend to continue CBCs for now given patient reported ongoing intermittent bleeding at our appointment. I have also not received an update from GI regarding next steps. She had appointment for this last week.

## 2025-05-07 NOTE — TELEPHONE ENCOUNTER
Spoke with Yani, she stated pt did see Karlos Benson last week and pt has hemorrhoids. The pt held the xarelto on her own because she started bleeding again, any time she takes the xarelto she bleeds. She also stated cardiology will not do the procedure until she gets the bleeding under control. She will have the pt call cardiology when she goes to see her today and will call us with an update.

## 2025-05-12 LAB
ANISOCYTOSIS: NORMAL
BASOPHILS ABSOLUTE: 0.1 K/UL (ref 0–0.2)
BASOPHILS RELATIVE PERCENT: 0.7 % (ref 0–1.5)
EOSINOPHILS ABSOLUTE: 0.2 K/UL (ref 0–0.33)
EOSINOPHILS RELATIVE PERCENT: 2.6 % (ref 0–3)
HCT VFR BLD CALC: 31.4 % (ref 36–44)
HEMOGLOBIN: 10 G/DL (ref 12–15)
LYMPHOCYTES ABSOLUTE: 1.7 K/UL (ref 1.1–4.8)
LYMPHOCYTES RELATIVE PERCENT: 24.3 % (ref 24–44)
MCH RBC QN AUTO: 24.7 PG (ref 28–34)
MCHC RBC AUTO-ENTMCNC: 31.8 G/DL (ref 33–37)
MCV RBC AUTO: 77.7 FL (ref 80–100)
MICROCYTES: NORMAL
MONOCYTES ABSOLUTE: 0.6 K/UL (ref 0.2–0.7)
MONOCYTES RELATIVE PERCENT: 7.8 % (ref 3.4–9)
NEUTROPHILS ABSOLUTE: 4.6 K/UL (ref 1.83–8.7)
NEUTROPHILS RELATIVE PERCENT: 64.6 % (ref 40–74)
PDW BLD-RTO: 26.3 % (ref 10.9–14.3)
PLATELET # BLD: 251 K/UL (ref 150–450)
PLATELET ESTIMATE: NORMAL
PMV BLD AUTO: 9.6 FL (ref 7.4–10.4)
POIKILOCYTES: NORMAL
RBC # BLD: 4.05 M/UL (ref 4–4.9)
RBC # BLD: NORMAL 10*6/UL
REFLEX: ABNORMAL
SCHISTOCYTES: NORMAL
WBC # BLD: 7.1 K/UL (ref 4.5–11)

## 2025-05-13 ENCOUNTER — OFFICE VISIT (OUTPATIENT)
Dept: ORTHOPEDIC SURGERY | Age: 89
End: 2025-05-13

## 2025-05-13 VITALS
OXYGEN SATURATION: 92 % | TEMPERATURE: 98 F | WEIGHT: 180 LBS | HEIGHT: 67 IN | DIASTOLIC BLOOD PRESSURE: 72 MMHG | HEART RATE: 67 BPM | SYSTOLIC BLOOD PRESSURE: 125 MMHG | BODY MASS INDEX: 28.25 KG/M2

## 2025-05-13 DIAGNOSIS — M17.0 BILATERAL PRIMARY OSTEOARTHRITIS OF KNEE: Primary | ICD-10-CM

## 2025-05-13 RX ORDER — TRIAMCINOLONE ACETONIDE 40 MG/ML
40 INJECTION, SUSPENSION INTRA-ARTICULAR; INTRAMUSCULAR ONCE
Status: SHIPPED | OUTPATIENT
Start: 2025-05-13

## 2025-05-13 RX ORDER — BUPIVACAINE HYDROCHLORIDE 2.5 MG/ML
1 INJECTION, SOLUTION INFILTRATION; PERINEURAL ONCE
Status: SHIPPED | OUTPATIENT
Start: 2025-05-13

## 2025-05-13 RX ORDER — LIDOCAINE HYDROCHLORIDE 10 MG/ML
1 INJECTION, SOLUTION INFILTRATION; PERINEURAL ONCE
Status: SHIPPED | OUTPATIENT
Start: 2025-05-13

## 2025-05-13 NOTE — PROGRESS NOTES
mixture of 40 mg of Kenalog and 1mL of 0.25% Marcaine and 1 mL of 1% Lidocaine without complication. A sterile bandage was applied. Patient tolerated well    Ji Pozo PA-C  Orthopaedic Surgery   1/28/25  8:49 AM

## 2025-05-14 PROBLEM — D50.9 IRON DEFICIENCY ANEMIA: Status: ACTIVE | Noted: 2025-05-14

## 2025-05-14 LAB
ANISOCYTOSIS: NORMAL
BASOPHILS ABSOLUTE: 0.1 K/UL (ref 0–0.2)
BASOPHILS RELATIVE PERCENT: 0.4 % (ref 0–1.5)
ELLIPTOCYTES: NORMAL
EOSINOPHILS ABSOLUTE: 0 K/UL (ref 0–0.33)
EOSINOPHILS RELATIVE PERCENT: 0 % (ref 0–3)
HCT VFR BLD CALC: 32 % (ref 36–44)
HEMOGLOBIN: 10.2 G/DL (ref 12–15)
HYPOCHROMIA: NORMAL
LYMPHOCYTES ABSOLUTE: 1.2 K/UL (ref 1.1–4.8)
LYMPHOCYTES RELATIVE PERCENT: 10.2 % (ref 24–44)
MCH RBC QN AUTO: 24.5 PG (ref 28–34)
MCHC RBC AUTO-ENTMCNC: 32 G/DL (ref 33–37)
MCV RBC AUTO: 76.7 FL (ref 80–100)
MICROCYTES: NORMAL
MONOCYTES ABSOLUTE: 0.4 K/UL (ref 0.2–0.7)
MONOCYTES RELATIVE PERCENT: 3.3 % (ref 3.4–9)
NEUTROPHILS ABSOLUTE: 10.3 K/UL (ref 1.83–8.7)
NEUTROPHILS RELATIVE PERCENT: 86.1 % (ref 40–74)
PDW BLD-RTO: 25.3 % (ref 10.9–14.3)
PLATELET # BLD: 257 K/UL (ref 150–450)
PLATELET ESTIMATE: NORMAL
PMV BLD AUTO: 9.7 FL (ref 7.4–10.4)
POIKILOCYTES: NORMAL
RBC # BLD: 4.17 M/UL (ref 4–4.9)
RBC # BLD: NORMAL 10*6/UL
REFLEX: ABNORMAL
WBC # BLD: 11.9 K/UL (ref 4.5–11)

## 2025-05-14 NOTE — TELEPHONE ENCOUNTER
Records request was sent.  However only received visit from 2022.  Please call Karlos Jacobss office.      Her hemoglobin is currently stable and slowly improving.  If she is not having any more bleeding or black stool, she should be able to resume her Xarelto for stroke prevention due to atrial fibrillation until her TAVR procedure.  Her local cardiologist also recommended she discuss possible Watchman procedure with them would eliminate the need for Xarelto.  Per their instructions it appears she supposed to hold the Xarelto for 24 hours prior to the procedure starting on June 9.  Will continue H&H's once she restarts her Xarelto to ensure her hemoglobin stays stable.

## 2025-05-15 NOTE — TELEPHONE ENCOUNTER
Called Karlos Jacobss office, They stated they only saw her back in January of 2022 and she had a colonoscopy and consult.  She then no showed and has not been back to their office.  She sent what they had.  [place] [facility] [place] return call to give results.

## 2025-05-15 NOTE — TELEPHONE ENCOUNTER
Pt called back. She stated she seen Dr Kelly last week and He told her to keep holding the Xarelto.

## 2025-05-19 ENCOUNTER — TELEPHONE (OUTPATIENT)
Dept: PRIMARY CARE CLINIC | Age: 89
End: 2025-05-19

## 2025-05-19 LAB
ANISOCYTOSIS: NORMAL
BASOPHILS ABSOLUTE: 0 K/UL (ref 0–0.2)
BASOPHILS RELATIVE PERCENT: 0.4 % (ref 0–1.5)
EOSINOPHILS ABSOLUTE: 0.2 K/UL (ref 0–0.33)
EOSINOPHILS RELATIVE PERCENT: 2.2 % (ref 0–3)
HCT VFR BLD CALC: 33.8 % (ref 36–44)
HEMOGLOBIN: 10.5 G/DL (ref 12–15)
HYPOCHROMIA: NORMAL
LYMPHOCYTES ABSOLUTE: 1.9 K/UL (ref 1.1–4.8)
LYMPHOCYTES RELATIVE PERCENT: 20.6 % (ref 24–44)
MCH RBC QN AUTO: 24.8 PG (ref 28–34)
MCHC RBC AUTO-ENTMCNC: 31.2 G/DL (ref 33–37)
MCV RBC AUTO: 79.5 FL (ref 80–100)
MICROCYTES: NORMAL
MONOCYTES ABSOLUTE: 0.7 K/UL (ref 0.2–0.7)
MONOCYTES RELATIVE PERCENT: 7 % (ref 3.4–9)
NEUTROPHILS ABSOLUTE: 6.5 K/UL (ref 1.83–8.7)
NEUTROPHILS RELATIVE PERCENT: 69.8 % (ref 40–74)
PDW BLD-RTO: 23.7 % (ref 10.9–14.3)
PLATELET # BLD: 222 K/UL (ref 150–450)
PMV BLD AUTO: 9.2 FL (ref 7.4–10.4)
POIKILOCYTES: NORMAL
RBC # BLD: 4.25 M/UL (ref 4–4.9)
RBC # BLD: NORMAL 10*6/UL
REFLEX: ABNORMAL
WBC # BLD: 9.3 K/UL (ref 4.5–11)

## 2025-05-19 NOTE — TELEPHONE ENCOUNTER
Yani cardona [facility] called and said that they have been drawing CBC on patient every Monday and Wednesday. Patient reports she is not having any further bleeding and her H&H is coming up. She wants to know if you want to reduce the blood draws from being done twice a week? She said anyone can call in and leave your instruction with one of the RN supervisors.

## 2025-05-21 ENCOUNTER — TELEPHONE (OUTPATIENT)
Dept: PRIMARY CARE CLINIC | Age: 89
End: 2025-05-21

## 2025-05-21 ENCOUNTER — HOSPITAL ENCOUNTER (OUTPATIENT)
Dept: PREADMISSION TESTING | Age: 89
Discharge: HOME | End: 2025-05-21

## 2025-05-21 NOTE — PROGRESS NOTES
Cardiac history including upcoming TAVR discussed with Dr. Pichardo. Also discussed that patient may be in  danger of losing vision if waiting to have eye surgery. Per Geovanna at Dr. Wood's office, medical and cardiac clearance are pending at this time. Per Dr. Pichardo, if cleared surgery can be done with sedation and a block. Above information relayed to Geovanna at Dr. Wood's office.

## 2025-05-21 NOTE — TELEPHONE ENCOUNTER
Patient states that she needs a pre op clearance ASAP. She said she is needing to have surgery with [facility] [place] and it will be done at [facility] as soon as they can get it together. She said that they were supposed to be faxed something to you about this.

## 2025-05-23 ENCOUNTER — OFFICE VISIT (OUTPATIENT)
Dept: PRIMARY CARE CLINIC | Age: 89
End: 2025-05-23
Payer: MEDICARE

## 2025-05-23 VITALS
OXYGEN SATURATION: 96 % | TEMPERATURE: 98.8 F | HEART RATE: 66 BPM | DIASTOLIC BLOOD PRESSURE: 60 MMHG | HEIGHT: 67 IN | BODY MASS INDEX: 28.88 KG/M2 | RESPIRATION RATE: 20 BRPM | WEIGHT: 184 LBS | SYSTOLIC BLOOD PRESSURE: 114 MMHG

## 2025-05-23 DIAGNOSIS — D50.0 IRON DEFICIENCY ANEMIA DUE TO CHRONIC BLOOD LOSS: ICD-10-CM

## 2025-05-23 DIAGNOSIS — I48.0 PAROXYSMAL ATRIAL FIBRILLATION (HCC): ICD-10-CM

## 2025-05-23 DIAGNOSIS — Z01.818 PRE-OPERATIVE GENERAL PHYSICAL EXAMINATION: Primary | ICD-10-CM

## 2025-05-23 DIAGNOSIS — I35.0 SEVERE AORTIC VALVE STENOSIS: ICD-10-CM

## 2025-05-23 DIAGNOSIS — J43.8 OTHER EMPHYSEMA (HCC): ICD-10-CM

## 2025-05-23 DIAGNOSIS — N18.32 STAGE 3B CHRONIC KIDNEY DISEASE (CKD) (HCC): ICD-10-CM

## 2025-05-23 DIAGNOSIS — R73.03 PREDIABETES: ICD-10-CM

## 2025-05-23 DIAGNOSIS — H40.1113 PRIMARY OPEN ANGLE GLAUCOMA (POAG) OF RIGHT EYE, SEVERE STAGE: ICD-10-CM

## 2025-05-23 DIAGNOSIS — K55.20 GI AVM (GASTROINTESTINAL ARTERIOVENOUS VASCULAR MALFORMATION): ICD-10-CM

## 2025-05-23 PROCEDURE — 99214 OFFICE O/P EST MOD 30 MIN: CPT | Performed by: STUDENT IN AN ORGANIZED HEALTH CARE EDUCATION/TRAINING PROGRAM

## 2025-05-23 PROCEDURE — 1123F ACP DISCUSS/DSCN MKR DOCD: CPT | Performed by: STUDENT IN AN ORGANIZED HEALTH CARE EDUCATION/TRAINING PROGRAM

## 2025-05-23 PROCEDURE — G2211 COMPLEX E/M VISIT ADD ON: HCPCS | Performed by: STUDENT IN AN ORGANIZED HEALTH CARE EDUCATION/TRAINING PROGRAM

## 2025-05-23 PROCEDURE — 1159F MED LIST DOCD IN RCRD: CPT | Performed by: STUDENT IN AN ORGANIZED HEALTH CARE EDUCATION/TRAINING PROGRAM

## 2025-05-23 PROCEDURE — 1160F RVW MEDS BY RX/DR IN RCRD: CPT | Performed by: STUDENT IN AN ORGANIZED HEALTH CARE EDUCATION/TRAINING PROGRAM

## 2025-05-23 NOTE — PROGRESS NOTES
PRE-OPERATIVE ASSESSMENT    25  Name: Joann Armas   : 1935   Age: 89 y.o.  Sex: female        Assessment & Plan:   Joann Armas is a 89 y.o. female with planned surgery [place] goniotomy with Iridex MP laser right eye for severe primary open-angle glaucoma on  with Dr. Wood.    Known risk factors for perioperative complications: Severe aortic valve stenosis pending TAVR, paroxysmal atrial fibrillation, emphysema, CKD3B, prediabetes, recent acute iron deficiency anemia secondary to gastric AVM s/p cauterization       Patient is not medically optimized for planned surgery given she is pending TAVR Anne 10 for severe aortic valve stenosis and is currently not at coagulated for atrial fibrillation due to recent severe anemia from GI AVM s/p cauterization, now improved with hemoglobin stable in 10s; however risk of foregoing surgery includes permanent blindness.  Patient is aware of this risk and is agreeable to proceeding with surgery for severe primary open-angle glaucoma.  She will undergo this procedure at [facility] given her high risk conditions.    Current medications which may produce withdrawal symptoms if withheld perioperatively: citalopram    Related to her upcoming TAVR, we did discuss that her cardiologist recommended that her surgeon consider Watchman procedure so that she may remain off of Xarelto indefinitely.  She and her son will reach out to her surgeon at [facility].    Return in about 6 weeks (around 2025).    This visit is inherently complex due to evaluation and management associated with medical care services that serve as the continuing focal point for health/medical care services that are part of ongoing care related to a patient's single, serious condition or a complex condition.     Subjective:     Chief Complaint   Patient presents with    Pre-op Exam     Heart Valve replacement and possible eye surgery in about 6 month     Joann Armas

## 2025-05-28 ENCOUNTER — RESULTS FOLLOW-UP (OUTPATIENT)
Dept: PRIMARY CARE CLINIC | Age: 89
End: 2025-05-28

## 2025-05-28 LAB
ANISOCYTOSIS: NORMAL
BASOPHILS ABSOLUTE: 0 K/UL (ref 0–0.2)
BASOPHILS RELATIVE PERCENT: 0.6 % (ref 0–1.5)
EOSINOPHILS ABSOLUTE: 0.1 K/UL (ref 0–0.33)
EOSINOPHILS RELATIVE PERCENT: 1.9 % (ref 0–3)
HCT VFR BLD CALC: 31.2 % (ref 36–44)
HEMOGLOBIN: 10.1 G/DL (ref 12–15)
LYMPHOCYTES ABSOLUTE: 1.7 K/UL (ref 1.1–4.8)
LYMPHOCYTES RELATIVE PERCENT: 22.1 % (ref 24–44)
MCH RBC QN AUTO: 24.9 PG (ref 28–34)
MCHC RBC AUTO-ENTMCNC: 32.5 G/DL (ref 33–37)
MCV RBC AUTO: 76.7 FL (ref 80–100)
MICROCYTES: NORMAL
MONOCYTES ABSOLUTE: 0.6 K/UL (ref 0.2–0.7)
MONOCYTES RELATIVE PERCENT: 7.5 % (ref 3.4–9)
NEUTROPHILS ABSOLUTE: 5.2 K/UL (ref 1.83–8.7)
NEUTROPHILS RELATIVE PERCENT: 67.9 % (ref 40–74)
PDW BLD-RTO: 23.4 % (ref 10.9–14.3)
PLATELET # BLD: 241 K/UL (ref 150–450)
PLATELET ESTIMATE: NORMAL
PMV BLD AUTO: 8.8 FL (ref 7.4–10.4)
RBC # BLD: 4.06 M/UL (ref 4–4.9)
REFLEX: ABNORMAL
WBC # BLD: 7.6 K/UL (ref 4.5–11)

## 2025-05-28 ASSESSMENT — ENCOUNTER SYMPTOMS
BLOOD IN STOOL: 0
SHORTNESS OF BREATH: 1
ANAL BLEEDING: 0
COUGH: 0
WHEEZING: 0
CONSTIPATION: 0
DIARRHEA: 0

## 2025-06-02 ENCOUNTER — ANESTHESIA EVENT (OUTPATIENT)
Dept: OPERATING ROOM | Age: 89
End: 2025-06-02
Payer: MEDICARE

## 2025-06-02 ASSESSMENT — COPD QUESTIONNAIRES: CAT_SEVERITY: MILD

## 2025-06-02 ASSESSMENT — LIFESTYLE VARIABLES: SMOKING_STATUS: 0

## 2025-06-02 NOTE — PROGRESS NOTES
[facility] PRE-ADMISSION TESTING INSTRUCTIONS: [phone]  [address] [place] [address]    The Preadmission Testing patient is instructed accordingly using the following criteria (check applicable):    ARRIVAL INSTRUCTIONS:     Arrival Time: 0800    [x] Parking the day of Surgery is located in the Main Entrance lot.  Upon entering through the main entrance (Entrance A) make an immediate right to the surgery reception desk    [x] Bring photo ID and insurance card    [x] Bring in a copy of Living will or Durable Power of  papers.    [x] Please be sure to arrange for a responsible adult to provide transportation to and from the hospital    [x] Please arrange for a responsible adult to be with you for the 24 hour period post procedure, due to having anesthesia    [x] Please notify surgeon if you develop any illness between now and time of surgery (cold, cough, sore throat, fever, nausea, vomiting) or any signs of infections  including skin, wounds, and dental.    [x] If you awake am of surgery not feeling well or have temperature >100 please call [phone].    GENERAL INSTRUCTIONS:    [x] NOTHING BY MOUTH AFTER MIDNIGHT. You may only have up to 8oz of water from midnight until 4 hours prior to surgery. No gum, no candy, no mints.        [x] You may brush your teeth    [x] Take medications as instructed: TAKE protonix AM of procedure.     [x] Stop herbal supplements and vitamins 5 days prior to procedure    [x] Follow preop dosing of blood thinners per physician instructions    [x] Shower or bath with soap, lather and rinse well, AM of Surgery, no lotion, powders or creams to surgical site    [x] Please do not wear any nail polish, make up, hair products, body spray, aftershave, cologne or perfume on the day of surgery    [x] Jewelry, body piercings, eyeglasses, contact lenses and dentures are not permitted into surgery (bring cases)    [x] No tobacco products within 24 hours

## 2025-06-02 NOTE — PROGRESS NOTES
Patient is cleared for upcoming eye surgery by cardiologist.  Discussed medical clearance note with Dr Alejandre and that patient is scheduled for an aortic valve replacement on 6/9.  Benefits of surgery with MAC anesthesia outweigh risks, Ok to proceed per Dr Alejandre.

## 2025-06-03 NOTE — ANESTHESIA PRE PROCEDURE
Department of Anesthesiology  Preprocedure Note       Name:  Joann Armas   Age:  89 y.o.  :  1935                                          MRN:  [id number]         Date:  2025      Surgeon: Surgeon(s):  Billy Wood MD    Procedure: Procedure(s):  KAHOOK GONIOTOMY RIGHT EYE WITH IRIDEX MICROPULSE LASER POSSIBLE RETROBULBULAR INJECTION    Medications prior to admission:   Prior to Admission medications    Medication Sig Start Date End Date Taking? Authorizing Provider   Cyanocobalamin (B-12) 1000 MCG SUBL Place 1 tablet under the tongue daily (with breakfast) 25  Yes Noah Luna MD   furosemide (LASIX) 20 MG tablet Take 1 tablet by mouth daily 25   Yung Garcia MD   ferrous sulfate (FE TABS) 325 (65 Fe) MG EC tablet Take 1 tablet by mouth every other day 25   Noah Luna MD   brinzolamide-brimonidine (SIMBRINZA) 1-0.2 % SUSP Apply 1 drop to eye every 8 (eight) hours    Yung Garcia MD   Latanoprost PF (IYUZEH) 0.005 % SOLN Apply to eye nightly    Yung Garcia MD   citalopram (CELEXA) 20 MG tablet Take 1 tablet by mouth daily  Patient taking differently: Take 1 tablet by mouth at bedtime 25   Noah Luna MD   levocetirizine (XYZAL) 5 MG tablet Take 1 tablet by mouth nightly 25   Noah Luna MD   pantoprazole (PROTONIX) 40 MG tablet Take 1 tablet by mouth daily 25   Noah Luna MD   XARELTO 15 MG TABS tablet Take 1 tablet by mouth daily  Patient not taking: Reported on 2025   Noah Luna MD   fluticasone (FLONASE) 50 MCG/ACT nasal spray 2 sprays by Nasal route daily 24   Elliot Ivy MD   albuterol sulfate HFA (VENTOLIN HFA) 108 (90 Base) MCG/ACT inhaler Inhale 2 puffs into the lungs every 4 hours as needed for Wheezing or Shortness of Breath (coughing) 24   Noah Luna MD Handicap Placard MISC by Does not apply route Expires 33   Noah Luna MD

## 2025-06-04 ENCOUNTER — ANESTHESIA (OUTPATIENT)
Dept: OPERATING ROOM | Age: 89
End: 2025-06-04
Payer: MEDICARE

## 2025-06-04 ENCOUNTER — HOSPITAL ENCOUNTER (OUTPATIENT)
Age: 89
Setting detail: OUTPATIENT SURGERY
Discharge: HOME | End: 2025-06-04
Attending: OPHTHALMOLOGY | Admitting: OPHTHALMOLOGY
Payer: MEDICARE

## 2025-06-04 VITALS
OXYGEN SATURATION: 95 % | HEIGHT: 67 IN | WEIGHT: 185 LBS | DIASTOLIC BLOOD PRESSURE: 80 MMHG | RESPIRATION RATE: 16 BRPM | SYSTOLIC BLOOD PRESSURE: 160 MMHG | BODY MASS INDEX: 29.03 KG/M2 | HEART RATE: 62 BPM | TEMPERATURE: 96.8 F

## 2025-06-04 LAB
GLUCOSE BLD-MCNC: 100 MG/DL (ref 74–99)
INR PPP: 1
PROTHROMBIN TIME: 10.4 SEC (ref 9.3–12.4)

## 2025-06-04 PROCEDURE — 2500000003 HC RX 250 WO HCPCS: Performed by: OPHTHALMOLOGY

## 2025-06-04 PROCEDURE — 6370000000 HC RX 637 (ALT 250 FOR IP)

## 2025-06-04 PROCEDURE — 7100000011 HC PHASE II RECOVERY - ADDTL 15 MIN: Performed by: OPHTHALMOLOGY

## 2025-06-04 PROCEDURE — 3700000000 HC ANESTHESIA ATTENDED CARE: Performed by: OPHTHALMOLOGY

## 2025-06-04 PROCEDURE — 6370000000 HC RX 637 (ALT 250 FOR IP): Performed by: OPHTHALMOLOGY

## 2025-06-04 PROCEDURE — 3600000002 HC SURGERY LEVEL 2 BASE: Performed by: OPHTHALMOLOGY

## 2025-06-04 PROCEDURE — 2720000010 HC SURG SUPPLY STERILE: Performed by: OPHTHALMOLOGY

## 2025-06-04 PROCEDURE — 6360000002 HC RX W HCPCS: Performed by: NURSE ANESTHETIST, CERTIFIED REGISTERED

## 2025-06-04 PROCEDURE — 82962 GLUCOSE BLOOD TEST: CPT

## 2025-06-04 PROCEDURE — 7100000010 HC PHASE II RECOVERY - FIRST 15 MIN: Performed by: OPHTHALMOLOGY

## 2025-06-04 PROCEDURE — 2580000003 HC RX 258: Performed by: NURSE ANESTHETIST, CERTIFIED REGISTERED

## 2025-06-04 PROCEDURE — 2709999900 HC NON-CHARGEABLE SUPPLY: Performed by: OPHTHALMOLOGY

## 2025-06-04 PROCEDURE — 3600000012 HC SURGERY LEVEL 2 ADDTL 15MIN: Performed by: OPHTHALMOLOGY

## 2025-06-04 PROCEDURE — 85610 PROTHROMBIN TIME: CPT

## 2025-06-04 PROCEDURE — 3700000001 HC ADD 15 MINUTES (ANESTHESIA): Performed by: OPHTHALMOLOGY

## 2025-06-04 RX ORDER — SODIUM CHLORIDE 9 MG/ML
INJECTION, SOLUTION INTRAVENOUS
Status: DISCONTINUED | OUTPATIENT
Start: 2025-06-04 | End: 2025-06-04 | Stop reason: SDUPTHER

## 2025-06-04 RX ORDER — PREDNISOLONE ACETATE 10 MG/ML
SUSPENSION/ DROPS OPHTHALMIC PRN
Status: DISCONTINUED | OUTPATIENT
Start: 2025-06-04 | End: 2025-06-04 | Stop reason: HOSPADM

## 2025-06-04 RX ORDER — POVIDONE-IODINE 5 %
SOLUTION, NON-ORAL OPHTHALMIC (EYE) PRN
Status: DISCONTINUED | OUTPATIENT
Start: 2025-06-04 | End: 2025-06-04 | Stop reason: ALTCHOICE

## 2025-06-04 RX ORDER — PILOCARPINE HYDROCHLORIDE 20 MG/ML
1 SOLUTION/ DROPS OPHTHALMIC EVERY 5 MIN PRN
Status: COMPLETED | OUTPATIENT
Start: 2025-06-04 | End: 2025-06-04

## 2025-06-04 RX ORDER — TETRACAINE HYDROCHLORIDE 5 MG/ML
1 SOLUTION OPHTHALMIC EVERY 10 MIN PRN
Status: COMPLETED | OUTPATIENT
Start: 2025-06-04 | End: 2025-06-04

## 2025-06-04 RX ORDER — SODIUM CHLORIDE 9 MG/ML
INJECTION, SOLUTION INTRAVENOUS CONTINUOUS
Status: DISCONTINUED | OUTPATIENT
Start: 2025-06-04 | End: 2025-06-04 | Stop reason: HOSPADM

## 2025-06-04 RX ORDER — TOBRAMYCIN 3 MG/ML
1 SOLUTION/ DROPS OPHTHALMIC EVERY 5 MIN PRN
Status: COMPLETED | OUTPATIENT
Start: 2025-06-04 | End: 2025-06-04

## 2025-06-04 RX ORDER — BALANCED SALT SOLUTION ENRICHED WITH BICARBONATE, DEXTROSE, AND GLUTATHIONE
KIT INTRAOCULAR PRN
Status: DISCONTINUED | OUTPATIENT
Start: 2025-06-04 | End: 2025-06-04 | Stop reason: HOSPADM

## 2025-06-04 RX ORDER — PROPOFOL 10 MG/ML
INJECTION, EMULSION INTRAVENOUS
Status: DISCONTINUED | OUTPATIENT
Start: 2025-06-04 | End: 2025-06-04 | Stop reason: SDUPTHER

## 2025-06-04 RX ADMIN — Medication 1 DROP: at 08:48

## 2025-06-04 RX ADMIN — Medication 1 DROP: at 08:55

## 2025-06-04 RX ADMIN — PROPOFOL 60 MG: 10 INJECTION, EMULSION INTRAVENOUS at 10:14

## 2025-06-04 RX ADMIN — TETRACAINE HYDROCHLORIDE 1 DROP: 5 SOLUTION OPHTHALMIC at 08:42

## 2025-06-04 RX ADMIN — TETRACAINE HYDROCHLORIDE 1 DROP: 5 SOLUTION OPHTHALMIC at 08:55

## 2025-06-04 RX ADMIN — TETRACAINE HYDROCHLORIDE 1 DROP: 5 SOLUTION OPHTHALMIC at 09:17

## 2025-06-04 RX ADMIN — PILOCARPINE HYDROCHLORIDE 1 DROP: 20 SOLUTION/ DROPS OPHTHALMIC at 08:48

## 2025-06-04 RX ADMIN — Medication 1 DROP: at 08:42

## 2025-06-04 RX ADMIN — SODIUM CHLORIDE: 9 INJECTION, SOLUTION INTRAVENOUS at 10:05

## 2025-06-04 RX ADMIN — PILOCARPINE HYDROCHLORIDE 1 DROP: 20 SOLUTION/ DROPS OPHTHALMIC at 08:42

## 2025-06-04 RX ADMIN — PILOCARPINE HYDROCHLORIDE 1 DROP: 20 SOLUTION/ DROPS OPHTHALMIC at 08:55

## 2025-06-04 ASSESSMENT — PAIN SCALES - GENERAL
PAINLEVEL_OUTOF10: 0

## 2025-06-04 ASSESSMENT — PAIN - FUNCTIONAL ASSESSMENT
PAIN_FUNCTIONAL_ASSESSMENT: 0-10
PAIN_FUNCTIONAL_ASSESSMENT: 0-10

## 2025-06-04 NOTE — BRIEF OP NOTE
Brief Postoperative Note      Patient: Joann Armas  YOB: 1935  MRN: [id number]    Date of Procedure: 6/4/2025    Pre-Op Diagnosis Codes:      * Primary open angle glaucoma of right eye, severe stage [H40.1113]    Post-Op Diagnosis: Same       Procedure(s):  KAHOOK GONIOTOMY RIGHT EYE WITH IRIDEX MICROPULSE LASER POSSIBLE RETROBULBULAR INJECTION             ++STAFF FROM ROOM 9++    Surgeon(s):  Billy Wood MD    Assistant:  * No surgical staff found *    Anesthesia: Monitor Anesthesia Care    Estimated Blood Loss (mL): Minimal    Complications: None    Specimens:   * No specimens in log *    Implants:  * No implants in log *      Drains: * No LDAs found *    Findings:  Infection Present At Time Of Surgery (PATOS) (choose all levels that have infection present):  No infection present  Other Findings: none    Electronically signed by Billy Wood MD on 6/4/2025 at 10:44 AM

## 2025-06-04 NOTE — ANESTHESIA POSTPROCEDURE EVALUATION
Department of Anesthesiology  Postprocedure Note    Patient: Joann Armas  MRN: [id number]  YOB: 1935  Date of evaluation: 6/4/2025    Procedure Summary       Date: 06/04/25 Room / Location: [address]    Anesthesia Start: 1005 Anesthesia Stop: 1050    Procedure: KAHOOK GONIOTOMY RIGHT EYE WITH IRIDEX MICROPULSE LASER POSSIBLE RETROBULBULAR INJECTION             ++STAFF FROM ROOM 9++ (Right) Diagnosis:       Primary open angle glaucoma of right eye, severe stage      (Primary open angle glaucoma of right eye, severe stage [H40.1113])    Surgeons: Billy Wood MD Responsible Provider: Billy Diehl DO    Anesthesia Type: MAC, general ASA Status: 4            Anesthesia Type: No value filed.    Mine Phase I: Mine Score: 10    Mine Phase II:      Anesthesia Post Evaluation    Patient location during evaluation: PACU  Patient participation: complete - patient participated  Level of consciousness: awake and alert  Pain score: 0  Airway patency: patent  Nausea & Vomiting: no nausea and no vomiting  Cardiovascular status: hemodynamically stable  Respiratory status: acceptable  Pain management: adequate        No notable events documented.

## 2025-06-04 NOTE — OP NOTE
[place] [address]                            OPERATIVE REPORT      PATIENT NAME: LYNN BRADLEY                 : 1935  MED REC NO: [id number]                        ROOM: [facility]  ACCOUNT NO: [id number]                       ADMIT DATE: 2025  PROVIDER: Billy Wood MD      DATE OF PROCEDURE:  2025    SURGEON:  Billy Wood MD    PREOPERATIVE DIAGNOSES:    1. Uncontrolled advanced open-angle glaucoma, right eye; status post canaloplasty, external approach; status post tube shunt; status post Ex-PRESS shunt implant, right eye.  2. Pseudophakia, right eye.    ANESTHESIA:  Local MAC.    COMPLICATIONS:  None.    PROCEDURES PERFORMED:    1. Goniotomy/canaloplasty, right eye.  2. Iridex cyclophotocoagulation laser to ciliary body, right eye.    INDICATIONS OF PROCEDURE:  The patient is a very pleasant 89-year-old female presenting to my office with the above mentioned procedures having been performed in this right eye and tremendous amount of scar tissue.  She is having a valve replaced in a few days and cannot undergo any extensive procedures at this time due to being on anticoagulants.    With risks, benefits, alternatives discussed fully, she wished to proceed with the above procedures.    DESCRIPTION OF OPERATION:  The patient was brought to the operating room, placed in supine position of the operating table.  She was administered IV sedation, and while under it, was given a 4 mL retrobulbar nerve block to the right eye consisting of a 50:50 mixture of 0.7% Marcaine, 2% lidocaine.  She was prepped and draped in the usual sterile fashion for intraocular surgery and attention was directed to the right eye where a lid speculum was placed and operating microscope brought into view.    The eye was entered temporally with 2.4 mm keratome.  Healon injected in the anterior chamber.  An Omni

## 2025-06-04 NOTE — DISCHARGE INSTRUCTIONS
Followup at [address] At 08:00 Friday AM on June 6th, Eye Care office    Remove patch tonight at 6:00 PM.  A little blood on gauze is normal.  Blurred vision is normal.    Continue using all pre-operative eyedrops and medications.    Additional eyedrops to right eye as follows:    Ciprofloxacin or ofloxacin (beige top) 4X daily  Prednisolone Acetate 1% one drop 4X daily (shake bottle before using)    Tobradex ointment into eye (1/2\" strip) at bedtime

## 2025-06-05 LAB
ANISOCYTOSIS: NORMAL
BASOPHILS ABSOLUTE: 0 K/UL (ref 0–0.2)
BASOPHILS RELATIVE PERCENT: 0.5 % (ref 0–1.5)
EOSINOPHILS ABSOLUTE: 0.2 K/UL (ref 0–0.33)
EOSINOPHILS RELATIVE PERCENT: 2.3 % (ref 0–3)
HCT VFR BLD CALC: 32.8 % (ref 36–44)
HEMOGLOBIN: 10.5 G/DL (ref 12–15)
LYMPHOCYTES ABSOLUTE: 1.9 K/UL (ref 1.1–4.8)
LYMPHOCYTES RELATIVE PERCENT: 27.1 % (ref 24–44)
MCH RBC QN AUTO: 24.8 PG (ref 28–34)
MCHC RBC AUTO-ENTMCNC: 32.2 G/DL (ref 33–37)
MCV RBC AUTO: 77 FL (ref 80–100)
MICROCYTES: NORMAL
MONOCYTES ABSOLUTE: 0.4 K/UL (ref 0.2–0.7)
MONOCYTES RELATIVE PERCENT: 5.6 % (ref 3.4–9)
NEUTROPHILS ABSOLUTE: 4.5 K/UL (ref 1.83–8.7)
NEUTROPHILS RELATIVE PERCENT: 64.5 % (ref 40–74)
OVALOCYTES: NORMAL
PDW BLD-RTO: 21.8 % (ref 10.9–14.3)
PLATELET # BLD: 268 K/UL (ref 150–450)
PLATELET ESTIMATE: NORMAL
PMV BLD AUTO: 9.3 FL (ref 7.4–10.4)
POIKILOCYTES: NORMAL
RBC # BLD: 4.26 M/UL (ref 4–4.9)
RBC # BLD: NORMAL 10*6/UL
REFLEX: ABNORMAL
SCHISTOCYTES: NORMAL
WBC # BLD: 6.9 K/UL (ref 4.5–11)

## 2025-06-07 DIAGNOSIS — D50.9 IRON DEFICIENCY ANEMIA, UNSPECIFIED IRON DEFICIENCY ANEMIA TYPE: ICD-10-CM

## 2025-06-07 DIAGNOSIS — K55.20 GI AVM (GASTROINTESTINAL ARTERIOVENOUS VASCULAR MALFORMATION): ICD-10-CM

## 2025-06-07 DIAGNOSIS — K92.2 GASTROINTESTINAL HEMORRHAGE, UNSPECIFIED GASTROINTESTINAL HEMORRHAGE TYPE: ICD-10-CM

## 2025-06-09 RX ORDER — FERROUS SULFATE 325(65) MG
325 TABLET, DELAYED RELEASE (ENTERIC COATED) ORAL EVERY OTHER DAY
Qty: 45 TABLET | Refills: 0 | Status: SHIPPED | OUTPATIENT
Start: 2025-06-09

## 2025-06-09 NOTE — TELEPHONE ENCOUNTER
Name of Medication(s) Requested:  Requested Prescriptions     Pending Prescriptions Disp Refills    ferrous sulfate (FE TABS 325) 325 (65 Fe) MG EC tablet [Pharmacy Med Name: Ferrous Sulfate 325 (65 Fe) MG Oral Tablet Delayed Release] 45 tablet 0     Sig: TAKE 1 TABLET BY MOUTH EVERY OTHER DAY       Medication is on current medication list Yes    Dosage and directions were verified? Yes    Quantity verified: 90 day supply     Pharmacy Verified?  Yes    Last Appointment:  5/23/2025    Future appts:  Future Appointments   Date Time Provider Department Center   7/11/2025 11:30 AM Noah Luna MD [place] [facility] DEP   8/14/2025  9:45 AM Kee Kolb DO [facility] ORTHO [facility]   8/29/2025  1:00 PM Noah Luna MD [place] [facility] DEP        (If no appt send self scheduling link. .REFILLAPPT)  Scheduling request sent?     [] Yes  [x] No    Does patient need updated?  [] Yes  [x] No

## 2025-06-16 ENCOUNTER — TELEPHONE (OUTPATIENT)
Dept: PRIMARY CARE CLINIC | Age: 89
End: 2025-06-16

## 2025-06-16 LAB
ANISOCYTOSIS: NORMAL
BASOPHILS ABSOLUTE: 0.1 K/UL (ref 0–0.2)
BASOPHILS RELATIVE PERCENT: 1.3 % (ref 0–1.5)
EOSINOPHILS ABSOLUTE: 0.1 K/UL (ref 0–0.33)
EOSINOPHILS RELATIVE PERCENT: 1.8 % (ref 0–3)
HCT VFR BLD CALC: 34.6 % (ref 36–44)
HEMOGLOBIN: 11.1 G/DL (ref 12–15)
HYPOCHROMIA: NORMAL
LYMPHOCYTES ABSOLUTE: 1.7 K/UL (ref 1.1–4.8)
LYMPHOCYTES RELATIVE PERCENT: 22.9 % (ref 24–44)
MCH RBC QN AUTO: 25.3 PG (ref 28–34)
MCHC RBC AUTO-ENTMCNC: 32 G/DL (ref 33–37)
MCV RBC AUTO: 79.1 FL (ref 80–100)
MICROCYTES: NORMAL
MONOCYTES ABSOLUTE: 0.4 K/UL (ref 0.2–0.7)
MONOCYTES RELATIVE PERCENT: 5.2 % (ref 3.4–9)
NEUTROPHILS ABSOLUTE: 5.1 K/UL (ref 1.83–8.7)
NEUTROPHILS RELATIVE PERCENT: 68.8 % (ref 40–74)
OVALOCYTES: NORMAL
PDW BLD-RTO: 23.1 % (ref 10.9–14.3)
PLATELET # BLD: 184 K/UL (ref 150–450)
PLATELET ESTIMATE: NORMAL
PMV BLD AUTO: 9.2 FL (ref 7.4–10.4)
POIKILOCYTES: NORMAL
RBC # BLD: 4.37 M/UL (ref 4–4.9)
RBC # BLD: NORMAL 10*6/UL
REFLEX: ABNORMAL
SCHISTOCYTES: NORMAL
WBC # BLD: 7.5 K/UL (ref 4.5–11)

## 2025-06-16 NOTE — TELEPHONE ENCOUNTER
Last Appointment:  5/23/2025  Future Appointments   Date Time Provider Department Center   7/11/2025 11:30 AM Noah Luna MD [place] [facility] DEP   8/14/2025  9:45 AM Kee Kolb DO [facility] ORTHO [facility]   8/29/2025  1:00 PM Noah Luna MD [place] [facility] DEP      [facility] called they are discharging patient today.  This will be the last CBC with diff they draw.    If patient is to continue they will need to set up with patient.    Electronically signed by Eleni Chanel LPN on 6/16/2025 at 1:50 PM

## 2025-07-11 ENCOUNTER — OFFICE VISIT (OUTPATIENT)
Dept: PRIMARY CARE CLINIC | Age: 89
End: 2025-07-11
Payer: MEDICARE

## 2025-07-11 VITALS
WEIGHT: 184 LBS | HEART RATE: 71 BPM | SYSTOLIC BLOOD PRESSURE: 140 MMHG | BODY MASS INDEX: 28.88 KG/M2 | HEIGHT: 67 IN | TEMPERATURE: 98 F | OXYGEN SATURATION: 95 % | DIASTOLIC BLOOD PRESSURE: 80 MMHG | RESPIRATION RATE: 20 BRPM

## 2025-07-11 DIAGNOSIS — D50.9 IRON DEFICIENCY ANEMIA, UNSPECIFIED IRON DEFICIENCY ANEMIA TYPE: ICD-10-CM

## 2025-07-11 DIAGNOSIS — I48.0 PAROXYSMAL ATRIAL FIBRILLATION (HCC): ICD-10-CM

## 2025-07-11 DIAGNOSIS — Z95.2 S/P TAVR (TRANSCATHETER AORTIC VALVE REPLACEMENT): Primary | ICD-10-CM

## 2025-07-11 DIAGNOSIS — E53.8 B12 DEFICIENCY: ICD-10-CM

## 2025-07-11 DIAGNOSIS — N28.89 LEFT KIDNEY MASS: ICD-10-CM

## 2025-07-11 DIAGNOSIS — K55.20 GI AVM (GASTROINTESTINAL ARTERIOVENOUS VASCULAR MALFORMATION): ICD-10-CM

## 2025-07-11 DIAGNOSIS — R73.03 PREDIABETES: ICD-10-CM

## 2025-07-11 DIAGNOSIS — E04.2 MULTINODULAR THYROID: ICD-10-CM

## 2025-07-11 DIAGNOSIS — I35.0 SEVERE AORTIC VALVE STENOSIS: ICD-10-CM

## 2025-07-11 DIAGNOSIS — H40.1113 PRIMARY OPEN ANGLE GLAUCOMA (POAG) OF RIGHT EYE, SEVERE STAGE: ICD-10-CM

## 2025-07-11 PROCEDURE — 1123F ACP DISCUSS/DSCN MKR DOCD: CPT | Performed by: STUDENT IN AN ORGANIZED HEALTH CARE EDUCATION/TRAINING PROGRAM

## 2025-07-11 PROCEDURE — 99214 OFFICE O/P EST MOD 30 MIN: CPT | Performed by: STUDENT IN AN ORGANIZED HEALTH CARE EDUCATION/TRAINING PROGRAM

## 2025-07-11 PROCEDURE — G2211 COMPLEX E/M VISIT ADD ON: HCPCS | Performed by: STUDENT IN AN ORGANIZED HEALTH CARE EDUCATION/TRAINING PROGRAM

## 2025-07-11 RX ORDER — ASPIRIN 81 MG/1
81 TABLET, CHEWABLE ORAL DAILY
COMMUNITY
Start: 2025-06-12 | End: 2025-07-11 | Stop reason: SDUPTHER

## 2025-07-11 RX ORDER — FERROUS SULFATE 325(65) MG
325 TABLET, DELAYED RELEASE (ENTERIC COATED) ORAL EVERY OTHER DAY
Qty: 45 TABLET | Refills: 1 | Status: SHIPPED | OUTPATIENT
Start: 2025-07-11

## 2025-07-11 RX ORDER — MAGNESIUM 200 MG
1 TABLET ORAL
Qty: 90 TABLET | Refills: 1 | Status: SHIPPED | OUTPATIENT
Start: 2025-07-11

## 2025-07-11 RX ORDER — ASPIRIN 81 MG/1
81 TABLET, CHEWABLE ORAL DAILY
Qty: 90 TABLET | Refills: 1 | Status: SHIPPED | OUTPATIENT
Start: 2025-07-11

## 2025-07-11 NOTE — PROGRESS NOTES
ESTABLISHED PRIMARY CARE VISIT    25  Name: Joann Armas   : 1935   Age: 89 y.o.  Sex: female        Assessment & Plan:     Problem List Items Addressed This Visit       B12 deficiency    Relevant Medications    Cyanocobalamin (B-12) 1000 MCG SUBL    Other Relevant Orders    CBC (Completed)    Vitamin B12 & Folate (Completed)    GI AVM (gastrointestinal arteriovenous vascular malformation)    History of GI bleeding with severe iron deficiency anemia  Following with GI         Relevant Medications    ferrous sulfate (FE TABS 325) 325 (65 Fe) MG EC tablet    Other Relevant Orders    CBC (Completed)    Iron deficiency anemia    Relevant Medications    ferrous sulfate (FE TABS 325) 325 (65 Fe) MG EC tablet    Other Relevant Orders    CBC (Completed)    Iron and TIBC (Completed)    Ferritin (Completed)    Left kidney mass    Check CT urogram         Relevant Orders    CT UROGRAM    Basic Metabolic Panel (Completed)    Multinodular thyroid    US  with heterogeneous thyroid gland with multiple nodules on left, largest 3.5 mm in diameter  CT chest  with nodule off inferior pole left thyroid with several small calcifications, stable since , not amenable to percutaneous sampling  Recheck for 5-year stability (now 6 years)         Paroxysmal atrial fibrillation (HCC)    Chronic, rate- and rhythm-controlled  Following with cardiology  Xarelto discontinued due to history of bleeding gastric AVM  Continue aspirin 81 mg daily         Relevant Medications    aspirin 81 MG chewable tablet    Prediabetes    Recheck         Relevant Orders    Hemoglobin A1C (Completed)    Primary open angle glaucoma (POAG) of right eye, severe stage    S/p surgery  Following with ophthalmology         S/P TAVR (transcatheter aortic valve replacement) - Primary    Relevant Medications    aspirin 81 MG chewable tablet    Severe aortic valve stenosis    Chronic, severe, now s/p TAVR         Relevant Medications    aspirin

## 2025-07-15 ENCOUNTER — CARE COORDINATION (OUTPATIENT)
Dept: CARE COORDINATION | Age: 89
End: 2025-07-15

## 2025-07-15 DIAGNOSIS — N28.89 LEFT KIDNEY MASS: ICD-10-CM

## 2025-07-15 DIAGNOSIS — E53.8 B12 DEFICIENCY: ICD-10-CM

## 2025-07-15 DIAGNOSIS — K55.20 GI AVM (GASTROINTESTINAL ARTERIOVENOUS VASCULAR MALFORMATION): ICD-10-CM

## 2025-07-15 DIAGNOSIS — R73.03 PREDIABETES: ICD-10-CM

## 2025-07-15 DIAGNOSIS — D50.9 IRON DEFICIENCY ANEMIA, UNSPECIFIED IRON DEFICIENCY ANEMIA TYPE: ICD-10-CM

## 2025-07-15 LAB
ANION GAP SERPL CALCULATED.3IONS-SCNC: 12 MMOL/L (ref 7–16)
BUN BLDV-MCNC: 22 MG/DL (ref 8–23)
CALCIUM SERPL-MCNC: 9.6 MG/DL (ref 8.8–10.2)
CHLORIDE BLD-SCNC: 105 MMOL/L (ref 98–107)
CO2: 24 MMOL/L (ref 22–29)
CREAT SERPL-MCNC: 1.3 MG/DL (ref 0.5–1)
FERRITIN: 153 NG/ML
FOLATE: 11.8 NG/ML (ref 4.6–34.8)
GFR, ESTIMATED: 39 ML/MIN/1.73M2
GLUCOSE BLD-MCNC: 103 MG/DL (ref 74–99)
HBA1C MFR BLD: 5.5 % (ref 4–5.6)
HCT VFR BLD CALC: 43.7 % (ref 34–48)
HEMOGLOBIN: 13.3 G/DL (ref 11.5–15.5)
IRON % SATURATION: 26 % (ref 15–50)
IRON: 80 UG/DL (ref 37–145)
MCH RBC QN AUTO: 27.5 PG (ref 26–35)
MCHC RBC AUTO-ENTMCNC: 30.4 G/DL (ref 32–34.5)
MCV RBC AUTO: 90.3 FL (ref 80–99.9)
PDW BLD-RTO: 18.2 % (ref 11.5–15)
PLATELET # BLD: 175 K/UL (ref 130–450)
PMV BLD AUTO: 10.6 FL (ref 7–12)
POTASSIUM SERPL-SCNC: 4.4 MMOL/L (ref 3.5–5.1)
RBC # BLD: 4.84 M/UL (ref 3.5–5.5)
SODIUM BLD-SCNC: 141 MMOL/L (ref 136–145)
TOTAL IRON BINDING CAPACITY: 312 UG/DL (ref 250–450)
VITAMIN B-12: 769 PG/ML (ref 232–1245)
WBC # BLD: 8.7 K/UL (ref 4.5–11.5)

## 2025-07-15 NOTE — CARE COORDINATION
Ambulatory Care Coordination Note     7/15/2025 2:12 PM     ACM outreach attempt by this ACM today to offer care management services. ACM was unable to reach the patient by telephone today;   left voice message requesting a return phone call to this ACM.     ACM: Laura Miranda RN     Care Summary Note: Will continue to reach out to assess needs for CC/RPM    PCP/Specialist follow up:   Future Appointments         Provider Specialty Dept Phone    8/14/2025 9:45 AM Kee Kolb DO Orthopedic Surgery [phone]    8/29/2025 1:00 PM Noah Luna MD Primary Care [phone]    8/29/2025 1:15 PM Noah Luna MD Primary Care [phone]            Follow Up:   Plan for next ACM outreach in approximately 1 week to complete:  Care management.

## 2025-07-16 ENCOUNTER — CARE COORDINATION (OUTPATIENT)
Dept: CARE COORDINATION | Age: 89
End: 2025-07-16

## 2025-07-16 SDOH — HEALTH STABILITY: PHYSICAL HEALTH: ON AVERAGE, HOW MANY MINUTES DO YOU ENGAGE IN EXERCISE AT THIS LEVEL?: 10 MIN

## 2025-07-16 SDOH — HEALTH STABILITY: PHYSICAL HEALTH: ON AVERAGE, HOW MANY DAYS PER WEEK DO YOU ENGAGE IN MODERATE TO STRENUOUS EXERCISE (LIKE A BRISK WALK)?: 4 DAYS

## 2025-07-16 ASSESSMENT — SOCIAL DETERMINANTS OF HEALTH (SDOH)
IN A TYPICAL WEEK, HOW MANY TIMES DO YOU TALK ON THE PHONE WITH FAMILY, FRIENDS, OR NEIGHBORS?: MORE THAN THREE TIMES A WEEK
HOW OFTEN DO YOU ATTENT MEETINGS OF THE CLUB OR ORGANIZATION YOU BELONG TO?: 1 TO 4 TIMES PER YEAR
DO YOU BELONG TO ANY CLUBS OR ORGANIZATIONS SUCH AS CHURCH GROUPS UNIONS, FRATERNAL OR ATHLETIC GROUPS, OR SCHOOL GROUPS?: YES
HOW OFTEN DO YOU ATTEND CHURCH OR RELIGIOUS SERVICES?: 1 TO 4 TIMES PER YEAR
HOW OFTEN DO YOU GET TOGETHER WITH FRIENDS OR RELATIVES?: MORE THAN THREE TIMES A WEEK

## 2025-07-16 NOTE — CARE COORDINATION
Ambulatory Care Coordination Note     2025 1:17 PM     Patient Current Location:  Home: [facility] [address]  [address]  [place] [address]     This patient was received as a referral from Ambulatory Care Manager .    ACM contacted the patient by telephone. Verified name and  with patient as identifiers. Provided introduction to self, and explanation of the ACM role.   Patient accepted care management services at this time.          ACM: Laura Miranda RN     Challenges to be reviewed by the provider   Additional needs identified to be addressed with provider No  none               Method of communication with provider: phone.    Utilization: Initial Call - N/A    Care Summary Note: Joann is receptive to care coordination, is engaged and pleasant. She mentions her son is staying with her at this time but does not live with her. She denies dizziness, dyspnea, headache. She did not have time to discuss RPM for monitoring of her vitals and weight at this time. She is aware of her follow up appointments and will discuss her recent blood work with her PCP upon her next visit. Will continue to assess needs for care coordination.Last noted weight was 184 lbs. Last noted A1C- May 5.5.    Offered patient enrollment in the Remote Patient Monitoring (RPM) program for in-home monitoring: Deferred at this time because she does not feel she can perform at this time; will discuss at next outreach.     Assessments Completed:       2025     9:38 AM   Amb Fall Risk Assessment and TUG Test   Do you feel unsteady or are you worried about falling?  yes   2 or more falls in past year? no   Fall with injury in past year? no    ,   Ambulatory Care Coordination Assessment    Care Coordination Protocol  Referral from Primary Care Provider: No  Week 1 - Initial Assessment     Do you have all of your prescriptions and are they filled?: Yes  Barriers to medication adherence: None  How often do you have trouble taking your medications the

## 2025-07-22 ASSESSMENT — ENCOUNTER SYMPTOMS
COUGH: 0
DIARRHEA: 0
SHORTNESS OF BREATH: 1
BLOOD IN STOOL: 0
WHEEZING: 0
ANAL BLEEDING: 0
CONSTIPATION: 0

## 2025-08-14 ENCOUNTER — OFFICE VISIT (OUTPATIENT)
Dept: ORTHOPEDIC SURGERY | Age: 89
End: 2025-08-14

## 2025-08-14 VITALS
OXYGEN SATURATION: 92 % | HEART RATE: 73 BPM | SYSTOLIC BLOOD PRESSURE: 114 MMHG | DIASTOLIC BLOOD PRESSURE: 74 MMHG | TEMPERATURE: 98.2 F | RESPIRATION RATE: 12 BRPM

## 2025-08-14 DIAGNOSIS — M17.0 BILATERAL PRIMARY OSTEOARTHRITIS OF KNEE: Primary | ICD-10-CM

## 2025-08-14 RX ORDER — TRIAMCINOLONE ACETONIDE 40 MG/ML
40 INJECTION, SUSPENSION INTRA-ARTICULAR; INTRAMUSCULAR ONCE
Status: COMPLETED | OUTPATIENT
Start: 2025-08-14 | End: 2025-08-14

## 2025-08-14 RX ORDER — LIDOCAINE HYDROCHLORIDE 10 MG/ML
1 INJECTION, SOLUTION INFILTRATION; PERINEURAL ONCE
Status: COMPLETED | OUTPATIENT
Start: 2025-08-14 | End: 2025-08-14

## 2025-08-14 RX ORDER — BUPIVACAINE HYDROCHLORIDE 2.5 MG/ML
1 INJECTION, SOLUTION INFILTRATION; PERINEURAL ONCE
Status: COMPLETED | OUTPATIENT
Start: 2025-08-14 | End: 2025-08-14

## 2025-08-14 RX ADMIN — TRIAMCINOLONE ACETONIDE 40 MG: 40 INJECTION, SUSPENSION INTRA-ARTICULAR; INTRAMUSCULAR at 13:10

## 2025-08-14 RX ADMIN — BUPIVACAINE HYDROCHLORIDE 2.5 MG: 2.5 INJECTION, SOLUTION INFILTRATION; PERINEURAL at 13:10

## 2025-08-14 RX ADMIN — TRIAMCINOLONE ACETONIDE 40 MG: 40 INJECTION, SUSPENSION INTRA-ARTICULAR; INTRAMUSCULAR at 13:11

## 2025-08-14 RX ADMIN — LIDOCAINE HYDROCHLORIDE 1 ML: 10 INJECTION, SOLUTION INFILTRATION; PERINEURAL at 13:10

## 2025-08-26 ENCOUNTER — OFFICE VISIT (OUTPATIENT)
Dept: ORTHOPEDIC SURGERY | Age: 89
End: 2025-08-26

## 2025-08-26 VITALS
HEIGHT: 67 IN | TEMPERATURE: 97.6 F | BODY MASS INDEX: 28.88 KG/M2 | HEART RATE: 82 BPM | RESPIRATION RATE: 18 BRPM | DIASTOLIC BLOOD PRESSURE: 69 MMHG | OXYGEN SATURATION: 94 % | WEIGHT: 184 LBS | SYSTOLIC BLOOD PRESSURE: 108 MMHG

## 2025-08-26 DIAGNOSIS — M17.0 BILATERAL PRIMARY OSTEOARTHRITIS OF KNEE: Primary | ICD-10-CM

## 2025-08-29 ENCOUNTER — OFFICE VISIT (OUTPATIENT)
Dept: PRIMARY CARE CLINIC | Age: 89
End: 2025-08-29

## 2025-08-29 VITALS
HEART RATE: 57 BPM | DIASTOLIC BLOOD PRESSURE: 68 MMHG | HEIGHT: 67 IN | TEMPERATURE: 97.8 F | TEMPERATURE: 97.8 F | WEIGHT: 185 LBS | RESPIRATION RATE: 20 BRPM | SYSTOLIC BLOOD PRESSURE: 126 MMHG | WEIGHT: 185 LBS | BODY MASS INDEX: 29.03 KG/M2 | OXYGEN SATURATION: 97 % | OXYGEN SATURATION: 97 % | DIASTOLIC BLOOD PRESSURE: 68 MMHG | BODY MASS INDEX: 29.03 KG/M2 | HEIGHT: 67 IN | SYSTOLIC BLOOD PRESSURE: 126 MMHG | RESPIRATION RATE: 20 BRPM | HEART RATE: 57 BPM

## 2025-08-29 DIAGNOSIS — K55.20 GI AVM (GASTROINTESTINAL ARTERIOVENOUS VASCULAR MALFORMATION): ICD-10-CM

## 2025-08-29 DIAGNOSIS — E53.8 B12 DEFICIENCY: ICD-10-CM

## 2025-08-29 DIAGNOSIS — D50.9 IRON DEFICIENCY ANEMIA, UNSPECIFIED IRON DEFICIENCY ANEMIA TYPE: ICD-10-CM

## 2025-08-29 DIAGNOSIS — K21.9 GASTROESOPHAGEAL REFLUX DISEASE, UNSPECIFIED WHETHER ESOPHAGITIS PRESENT: ICD-10-CM

## 2025-08-29 DIAGNOSIS — J30.2 SEASONAL ALLERGIC RHINITIS, UNSPECIFIED TRIGGER: ICD-10-CM

## 2025-08-29 DIAGNOSIS — N18.32 STAGE 3B CHRONIC KIDNEY DISEASE (CKD) (HCC): ICD-10-CM

## 2025-08-29 DIAGNOSIS — Z00.00 MEDICARE ANNUAL WELLNESS VISIT, SUBSEQUENT: Primary | ICD-10-CM

## 2025-08-29 DIAGNOSIS — Z95.2 S/P TAVR (TRANSCATHETER AORTIC VALVE REPLACEMENT): ICD-10-CM

## 2025-08-29 DIAGNOSIS — I48.0 PAROXYSMAL ATRIAL FIBRILLATION (HCC): Primary | ICD-10-CM

## 2025-08-29 DIAGNOSIS — H40.1113 PRIMARY OPEN ANGLE GLAUCOMA (POAG) OF RIGHT EYE, SEVERE STAGE: ICD-10-CM

## 2025-08-29 DIAGNOSIS — F41.1 GENERALIZED ANXIETY DISORDER: ICD-10-CM

## 2025-08-29 RX ORDER — LEVOCETIRIZINE DIHYDROCHLORIDE 5 MG/1
5 TABLET, FILM COATED ORAL NIGHTLY
Qty: 90 TABLET | Refills: 1 | Status: SHIPPED | OUTPATIENT
Start: 2025-08-29

## 2025-08-29 RX ORDER — PANTOPRAZOLE SODIUM 40 MG/1
40 TABLET, DELAYED RELEASE ORAL DAILY
Qty: 90 TABLET | Refills: 1 | Status: SHIPPED | OUTPATIENT
Start: 2025-08-29

## 2025-08-29 RX ORDER — ASPIRIN 81 MG/1
81 TABLET ORAL DAILY
Qty: 90 TABLET | Refills: 1 | Status: SHIPPED | OUTPATIENT
Start: 2025-08-29

## 2025-08-29 ASSESSMENT — ENCOUNTER SYMPTOMS
COUGH: 0
WHEEZING: 0
ANAL BLEEDING: 0
CONSTIPATION: 0
SHORTNESS OF BREATH: 1
DIARRHEA: 0
BLOOD IN STOOL: 0

## 2025-08-29 ASSESSMENT — PATIENT HEALTH QUESTIONNAIRE - PHQ9
2. FEELING DOWN, DEPRESSED OR HOPELESS: NOT AT ALL
SUM OF ALL RESPONSES TO PHQ QUESTIONS 1-9: 0
1. LITTLE INTEREST OR PLEASURE IN DOING THINGS: NOT AT ALL
SUM OF ALL RESPONSES TO PHQ QUESTIONS 1-9: 0

## 2025-09-02 ENCOUNTER — OFFICE VISIT (OUTPATIENT)
Dept: ORTHOPEDIC SURGERY | Age: 89
End: 2025-09-02

## 2025-09-02 VITALS
OXYGEN SATURATION: 97 % | RESPIRATION RATE: 16 BRPM | HEART RATE: 78 BPM | SYSTOLIC BLOOD PRESSURE: 104 MMHG | TEMPERATURE: 97.8 F | DIASTOLIC BLOOD PRESSURE: 66 MMHG

## 2025-09-02 DIAGNOSIS — M17.0 BILATERAL PRIMARY OSTEOARTHRITIS OF KNEE: Primary | ICD-10-CM

## (undated) DEVICE — Device

## (undated) DEVICE — SYRINGE MED 5ML STD CLR PLAS LUERLOCK TIP N CTRL DISP

## (undated) DEVICE — Device: Brand: MICROPULSE® P3 DEVICE BOX OF 6

## (undated) DEVICE — CANNULA OPHTH 25GA 7 8IN ORNG 45DEG ANG 4MM FR END DOME SHP

## (undated) DEVICE — SCALPEL 15 DEG NO 715

## (undated) DEVICE — SOLUTION IRRIGATION BAL SALT SOLUTION 15 ML STRL BSS

## (undated) DEVICE — ALCON INTREPID CURVED 0.3MM POLYMER I/A TIP: Brand: ALCON, INTREPID

## (undated) DEVICE — SOLUTION IRRIG 1000ML STRL H2O USP PLAS POUR BTL

## (undated) DEVICE — MARKER,SKIN,WI/RULER AND LABELS: Brand: MEDLINE

## (undated) DEVICE — 40436 HEAD REST OCULAR: Brand: 40436 HEAD REST OCULAR

## (undated) DEVICE — GLASSES SAFETY PROTCT GRN

## (undated) DEVICE — SHIELD EYE W3XL2.5IN UNIV CLR PLAS LTWT

## (undated) DEVICE — SYRINGE MED 10ML TRNSLUC BRL PLUNG BLK MRK POLYPR CTRL

## (undated) DEVICE — SPEAR SURG TRIANG SHP HNDL PCH WECK-CEL

## (undated) DEVICE — PAD PREP L 2 PLY 70% ISO ALC NONWOVEN SFT ABSRB TOP ANTISEP

## (undated) DEVICE — PAD,EYE,1-5/8X2 5/8,STERILE,LF,1/PK: Brand: MEDLINE

## (undated) DEVICE — STERILE POLYISOPRENE POWDER-FREE SURGICAL GLOVES: Brand: PROTEXIS

## (undated) DEVICE — SYSTEM SURGICAL OMNI

## (undated) DEVICE — GLOVE ORANGE PI 7   MSG9070

## (undated) DEVICE — COVER MICROSCOPE KNOB SM

## (undated) DEVICE — 1 ML TUBERCULIN SYRINGE,DETACHABLE NEEDLE: Brand: MONOJECT

## (undated) DEVICE — AGENT VISCOELASTIC 0.85ML SOD HYALURONATE OPHTH HEALON GV

## (undated) DEVICE — GLOVE SURG SZ 8 CRM LTX FREE POLYISOPRENE POLYMER BEAD ANTI

## (undated) DEVICE — GLOVE SURG SZ 65 CRM LTX FREE POLYISOPRENE POLYMER BEAD ANTI

## (undated) DEVICE — BETADINE 5% EYE SOL

## (undated) DEVICE — COLLECTOR SHRP 3GAL YEL CHEMO

## (undated) DEVICE — PROBE HEMSTAT 18GA ERAS BVL TIP STR BPLR WET-FIELD